# Patient Record
Sex: MALE | Race: WHITE | Employment: FULL TIME | URBAN - METROPOLITAN AREA
[De-identification: names, ages, dates, MRNs, and addresses within clinical notes are randomized per-mention and may not be internally consistent; named-entity substitution may affect disease eponyms.]

---

## 2023-03-22 ENCOUNTER — TELEMEDICINE (OUTPATIENT)
Dept: PHARMACY | Facility: HOSPITAL | Age: 23
End: 2023-03-22
Payer: COMMERCIAL

## 2023-03-22 DIAGNOSIS — E10.9 TYPE 1 DIABETES, HBA1C GOAL < 7% (MULTI): Primary | ICD-10-CM

## 2023-03-22 DIAGNOSIS — E10.9 TYPE 1 DIABETES, HBA1C GOAL < 7% (MULTI): ICD-10-CM

## 2023-03-22 RX ORDER — LANCETS
EACH MISCELLANEOUS
COMMUNITY
Start: 2023-01-15 | End: 2024-03-01 | Stop reason: SDUPTHER

## 2023-03-22 RX ORDER — BLOOD-GLUCOSE SENSOR
EACH MISCELLANEOUS
COMMUNITY
Start: 2023-03-09 | End: 2023-05-16 | Stop reason: SDUPTHER

## 2023-03-22 RX ORDER — INSULIN PUMP CONTROLLER
EACH MISCELLANEOUS
COMMUNITY
Start: 2023-03-09

## 2023-03-22 RX ORDER — BLOOD-GLUCOSE METER
EACH MISCELLANEOUS
COMMUNITY
Start: 2022-10-31

## 2023-03-22 RX ORDER — BLOOD-GLUCOSE TRANSMITTER
EACH MISCELLANEOUS
COMMUNITY
Start: 2023-03-09 | End: 2023-05-15 | Stop reason: SDUPTHER

## 2023-03-22 RX ORDER — BLOOD SUGAR DIAGNOSTIC
STRIP MISCELLANEOUS
COMMUNITY
Start: 2022-10-31 | End: 2024-03-01 | Stop reason: SDUPTHER

## 2023-03-22 RX ORDER — INSULIN ASPART 100 [IU]/ML
INJECTION, SOLUTION INTRAVENOUS; SUBCUTANEOUS
COMMUNITY
Start: 2022-03-24 | End: 2024-03-01 | Stop reason: ALTCHOICE

## 2023-03-22 ASSESSMENT — ENCOUNTER SYMPTOMS: DIABETIC ASSOCIATED SYMPTOMS: 0

## 2023-03-22 NOTE — PROGRESS NOTES
Jimbo Kramer is a 23 y.o. male was referred to Clinical Pharmacy Team to complete a comprehensive medication review (CMR) with a pharmacist as part of the [employer] diabetes program.  The patient was referred for their Diabetes.    Referring Provider: Vj Moody, DO    Subjective   No Known Allergies  No Pharmacies Listed    Diabetes  He presents for his initial diabetic visit. He has type 1 diabetes mellitus. His disease course has been stable. There are no hypoglycemic associated symptoms. There are no diabetic associated symptoms. There are no hypoglycemic complications. Symptoms are stable. There are no diabetic complications. Current diabetic treatment includes insulin pump. He is compliant with treatment all of the time. He is following a generally healthy diet.       Objective     There were no vitals taken for this visit.     LAB  Lab Results   Component Value Date    BILITOT 1.0 02/10/2022    CALCIUM 10.8 (H) 02/24/2022    CO2 29 02/24/2022    CL 98 02/24/2022    CREATININE 0.97 02/24/2022    GLUCOSE 255 (H) 02/24/2022    ALKPHOS 64 02/10/2022    K 4.6 02/24/2022    PROT 7.5 02/10/2022     02/24/2022    AST 15 02/10/2022    ALT 17 02/10/2022    BUN 20 02/24/2022    ANIONGAP 14 02/24/2022    MG 2.10 07/03/2020    ALBUMIN 5.0 02/10/2022    GFRMALE >90 02/24/2022     Lab Results   Component Value Date    TRIG 76 02/10/2022    CHOL 184 02/10/2022    HDL 47.0 02/10/2022     Lab Results   Component Value Date    HGBA1C 7.1 (A) 02/10/2022       Current Outpatient Medications on File Prior to Visit   Medication Sig Dispense Refill    Accu-Chek Guide Me Glucose Mtr misc       Accu-Chek Guide test strips strip USE TO TEST BLOOD SUGAR UP TO 4 TIMES DAILY AS NEEDED      Dexcom G6 Sensor device       Dexcom G6 Transmitter device       NovoLOG U-100 Insulin aspart 100 unit/mL injection Inject under the skin.      Omnipod Dash Pods, Gen 4, cartridge       Accu-Chek Softclix Lancets misc         No current facility-administered medications on file prior to visit.        CURRENT DIABETES PHARMACOTHERAPY  Novolog 100 units/mL: For continuous subQ infusion with insulin pump (up to 40 units per day)  Dexcom G6 Sensor/Transmitter: uses as directed    DRUG INTERACTIONS  - No significant drug-drug interactions exist that require change in therapy    SECONDARY PREVENTION  - Statin? no  - ACE-I/ARB? no    Assessment/Plan   Problem List Items Addressed This Visit    None  Visit Diagnoses       Type 1 diabetes, HbA1c goal < 7% (CMS/[facility])               PATIENT EDUCATION/GOALS    1. Discussed disease specific goals:   - Fasting B - 130 mg/dL  - Postprandial BG: less than 180 mg/dL  - A1c: less than 7%    2. Discussed pharmacy benefit plan:  - Explained that in order to continue receiving diabetic medications with added cost savings ($0 copays), patient must have an annual consultation with a clinical pharmacist to review medications, address any concerns, and manage diabetic medications when appropriate   _______________________________________________________________________  PLAN  Continue all medications as prescribed.   Patient information recorded and transmitted to [employer] for VBID override for diabetes medication copay reduction. Patient's insurance will be updated for the additional benefit of $0 copays in 7-10 business days.    Follow up: 12 months     Olga May, PharmD, LORRIE     Verbal consent to manage patient's drug therapy was obtained from [the patient and/or an individual authorized to act on behalf of a patient]. They were informed they may decline to participate or withdraw from participation in pharmacy services at any time.

## 2023-03-28 NOTE — PROGRESS NOTES
I reviewed the progress note and agree with the resident’s findings and plans as written. Case discussed with resident.    Payam Salas, PharmD

## 2023-05-15 ENCOUNTER — TELEPHONE (OUTPATIENT)
Dept: PRIMARY CARE | Facility: CLINIC | Age: 23
End: 2023-05-15
Payer: COMMERCIAL

## 2023-05-15 DIAGNOSIS — E10.9 TYPE 1 DIABETES MELLITUS WITHOUT COMPLICATION (MULTI): Primary | ICD-10-CM

## 2023-05-15 RX ORDER — BLOOD-GLUCOSE TRANSMITTER
EACH MISCELLANEOUS
Qty: 1 EACH | Refills: 0 | Status: SHIPPED | OUTPATIENT
Start: 2023-05-15 | End: 2023-08-28

## 2023-05-15 NOTE — TELEPHONE ENCOUNTER
Patient advised his Dexcom transmitter  asking for a new prescription to be sent to his pharmacy       Please advise [phone]

## 2023-05-16 DIAGNOSIS — E10.9 TYPE 1 DIABETES MELLITUS WITHOUT COMPLICATION (MULTI): Primary | ICD-10-CM

## 2023-05-16 RX ORDER — BLOOD-GLUCOSE SENSOR
EACH MISCELLANEOUS
Qty: 9 EACH | Refills: 2 | Status: SHIPPED | OUTPATIENT
Start: 2023-05-16 | End: 2024-03-01 | Stop reason: SDUPTHER

## 2023-05-26 LAB
ALANINE AMINOTRANSFERASE (SGPT) (U/L) IN SER/PLAS: 19 U/L (ref 10–52)
ALBUMIN (G/DL) IN SER/PLAS: 4.5 G/DL (ref 3.4–5)
ALBUMIN (MG/L) IN URINE: 9.5 MG/L
ALBUMIN/CREATININE (UG/MG) IN URINE: 12.2 UG/MG CRT (ref 0–30)
ALKALINE PHOSPHATASE (U/L) IN SER/PLAS: 57 U/L (ref 33–120)
ANION GAP IN SER/PLAS: 12 MMOL/L (ref 10–20)
ASPARTATE AMINOTRANSFERASE (SGOT) (U/L) IN SER/PLAS: 15 U/L (ref 9–39)
BILIRUBIN TOTAL (MG/DL) IN SER/PLAS: 0.4 MG/DL (ref 0–1.2)
CALCIUM (MG/DL) IN SER/PLAS: 10.2 MG/DL (ref 8.6–10.6)
CARBON DIOXIDE, TOTAL (MMOL/L) IN SER/PLAS: 29 MMOL/L (ref 21–32)
CHLORIDE (MMOL/L) IN SER/PLAS: 101 MMOL/L (ref 98–107)
CREATININE (MG/DL) IN SER/PLAS: 0.81 MG/DL (ref 0.5–1.3)
CREATININE (MG/DL) IN URINE: 78 MG/DL (ref 20–370)
ESTIMATED AVERAGE GLUCOSE FOR HBA1C: 143 MG/DL
GFR MALE: >90 ML/MIN/1.73M2
GLUCOSE (MG/DL) IN SER/PLAS: 226 MG/DL (ref 74–99)
HEMOGLOBIN A1C/HEMOGLOBIN TOTAL IN BLOOD: 6.6 %
POTASSIUM (MMOL/L) IN SER/PLAS: 4.5 MMOL/L (ref 3.5–5.3)
PROTEIN TOTAL: 7.4 G/DL (ref 6.4–8.2)
SODIUM (MMOL/L) IN SER/PLAS: 137 MMOL/L (ref 136–145)
THYROTROPIN (MIU/L) IN SER/PLAS BY DETECTION LIMIT <= 0.05 MIU/L: 1.45 MIU/L (ref 0.44–3.98)
UREA NITROGEN (MG/DL) IN SER/PLAS: 23 MG/DL (ref 6–23)

## 2023-07-12 PROBLEM — E10.9 DM (DIABETES MELLITUS), TYPE 1 (MULTI): Status: ACTIVE | Noted: 2023-07-12

## 2023-07-12 PROBLEM — F41.9 ANXIETY: Status: ACTIVE | Noted: 2023-07-12

## 2023-07-12 PROBLEM — R07.89 ATYPICAL CHEST PAIN: Status: ACTIVE | Noted: 2023-07-12

## 2023-07-12 PROBLEM — M89.8X7: Status: ACTIVE | Noted: 2023-07-12

## 2023-07-12 PROBLEM — S90.31XA CONTUSION OF RIGHT FOOT: Status: ACTIVE | Noted: 2023-07-12

## 2023-07-14 ENCOUNTER — OFFICE VISIT (OUTPATIENT)
Dept: PRIMARY CARE | Facility: CLINIC | Age: 23
End: 2023-07-14
Payer: COMMERCIAL

## 2023-07-14 ENCOUNTER — PATIENT MESSAGE (OUTPATIENT)
Dept: PRIMARY CARE | Facility: CLINIC | Age: 23
End: 2023-07-14

## 2023-07-14 VITALS
OXYGEN SATURATION: 98 % | DIASTOLIC BLOOD PRESSURE: 72 MMHG | HEIGHT: 70 IN | TEMPERATURE: 97 F | HEART RATE: 72 BPM | BODY MASS INDEX: 25.91 KG/M2 | WEIGHT: 181 LBS | SYSTOLIC BLOOD PRESSURE: 110 MMHG

## 2023-07-14 DIAGNOSIS — R41.840 DIFFICULTY CONCENTRATING: Primary | ICD-10-CM

## 2023-07-14 DIAGNOSIS — F41.9 ANXIETY: ICD-10-CM

## 2023-07-14 PROCEDURE — 3044F HG A1C LEVEL LT 7.0%: CPT | Performed by: INTERNAL MEDICINE

## 2023-07-14 PROCEDURE — 3078F DIAST BP <80 MM HG: CPT | Performed by: INTERNAL MEDICINE

## 2023-07-14 PROCEDURE — 99214 OFFICE O/P EST MOD 30 MIN: CPT | Performed by: INTERNAL MEDICINE

## 2023-07-14 PROCEDURE — 3074F SYST BP LT 130 MM HG: CPT | Performed by: INTERNAL MEDICINE

## 2023-07-14 PROCEDURE — 1036F TOBACCO NON-USER: CPT | Performed by: INTERNAL MEDICINE

## 2023-07-14 RX ORDER — BUPROPION HYDROCHLORIDE 150 MG/1
150 TABLET ORAL EVERY MORNING
Qty: 30 TABLET | Refills: 1 | Status: SHIPPED | OUTPATIENT
Start: 2023-07-14 | End: 2023-09-18 | Stop reason: SDUPTHER

## 2023-07-14 ASSESSMENT — ENCOUNTER SYMPTOMS
DECREASED CONCENTRATION: 1
CONSTIPATION: 0
DIARRHEA: 0
SHORTNESS OF BREATH: 0

## 2023-07-14 NOTE — PROGRESS NOTES
Subjective     Patient ID: Jimbo Kramer is a 23 y.o. male who presents for office visit (anxiety).    The patient reports having difficulty with focus, particularly during conversations related to work and while driving.  He notes that anxiety symptoms otherwise have improved overall since stopping buspirone and escitalopram.  The patient's brother passed away in 2020, and he is grieving the recent loss of his friend to suicide.  He inquires about starting with a Counselor.      The patient has been careful to maintain a healthy diet in the setting of Diabetes Mellitus I, and follows regularly with  from Endocrinology.  His last HbA1c was 6.6% in 5/2023.  He exercises regularly and is able to run an 8 minute mile.  The patient denies any dyspnea or bowel problems.    The patient notes left ear fullness after drinking caffeine.    The patient does not drink alcohol.  He notes a family history of Alcohol Dependence, and has avoided drinking altogether.     The person currently works in  with a [employer], and previously worked in politics.      Review of Systems   HENT:          Positive for left ear fullness.   Respiratory:  Negative for shortness of breath.    Gastrointestinal:  Negative for constipation and diarrhea.   Psychiatric/Behavioral:  Positive for decreased concentration.      Objective   Physical Exam  Constitutional:       Appearance: Normal appearance.   HENT:      Right Ear: Tympanic membrane and ear canal normal. There is no impacted cerumen.      Left Ear: Tympanic membrane and ear canal normal. There is no impacted cerumen.   Cardiovascular:      Rate and Rhythm: Normal rate and regular rhythm.      Heart sounds: Normal heart sounds.   Pulmonary:      Effort: Pulmonary effort is normal.      Breath sounds: Normal breath sounds.   Abdominal:      General: Bowel sounds are normal.      Palpations: Abdomen is soft.      Tenderness: There is no abdominal  tenderness.   Skin:     General: Skin is warm and dry.   Neurological:      General: No focal deficit present.      Mental Status: He is alert and oriented to person, place, and time. Mental status is at baseline.   Psychiatric:         Mood and Affect: Mood normal.         Behavior: Behavior normal.       Assessment/Plan       IMPRESSIONS/PLAN:     Difficulty Concentrating  - Completed ANNE 7 and PHQ 9 in the office today.  Is open to trying medication if necessary, and discussed bupropion including therapeutic expectations and adverse effect profile.  Patient will look into this for now.  Ordered referral to Behavioral Health Collaborative Care with Cheryl Carlson.  Patient prefers virtual visits.    Anxiety   - Symptoms significantly improved since discontinuing buspirone 10 mg BID and escitalopram 15 mg in 10/2022 when the patient ran out of refills. Reports weight gain but no other adverse effects. Completed ANNE 7 and PHQ 9 in the office today.  Ordered referral to Behavioral Health Collaborative Care with Cheryl Carlson.  Patient prefers virtual visits.    /72 in the office today.    DM I   - Last HbA1c 6.6% per 5/2023.  Continue with NovoLog 100 unit/ml as subcutaneous infusion with insulin pump delivering up to 40 units per day.  Following with  from Endocrinology.     Health Maintenance   - Routine labs up to date 2/2022.     Follow-up in 6 months, call sooner if needed.        Scribe Attestation  By signing my name below, IKait Scribe   attest that this documentation has been prepared under the direction and in the presence of Vj Moody DO.

## 2023-08-09 ENCOUNTER — APPOINTMENT (OUTPATIENT)
Dept: PRIMARY CARE | Facility: CLINIC | Age: 23
End: 2023-08-09
Payer: COMMERCIAL

## 2023-08-22 ENCOUNTER — APPOINTMENT (OUTPATIENT)
Dept: PRIMARY CARE | Facility: CLINIC | Age: 23
End: 2023-08-22
Payer: COMMERCIAL

## 2023-08-28 DIAGNOSIS — E10.9 TYPE 1 DIABETES MELLITUS WITHOUT COMPLICATION (MULTI): ICD-10-CM

## 2023-08-28 RX ORDER — BLOOD-GLUCOSE TRANSMITTER
EACH MISCELLANEOUS
Qty: 1 EACH | Refills: 0 | Status: SHIPPED | OUTPATIENT
Start: 2023-08-28 | End: 2023-08-28

## 2023-09-18 DIAGNOSIS — R41.840 DIFFICULTY CONCENTRATING: ICD-10-CM

## 2023-09-18 DIAGNOSIS — F41.9 ANXIETY: ICD-10-CM

## 2023-09-18 RX ORDER — BUPROPION HYDROCHLORIDE 150 MG/1
150 TABLET ORAL EVERY MORNING
Qty: 30 TABLET | Refills: 1 | Status: SHIPPED | OUTPATIENT
Start: 2023-09-18 | End: 2023-11-27 | Stop reason: SDUPTHER

## 2023-09-20 ENCOUNTER — TELEPHONE (OUTPATIENT)
Dept: PRIMARY CARE | Facility: CLINIC | Age: 23
End: 2023-09-20
Payer: COMMERCIAL

## 2023-09-20 DIAGNOSIS — B00.1 RECURRENT COLD SORES: Primary | ICD-10-CM

## 2023-09-20 RX ORDER — VALACYCLOVIR HYDROCHLORIDE 1 G/1
2000 TABLET, FILM COATED ORAL 2 TIMES DAILY
Qty: 4 TABLET | Refills: 1 | Status: SHIPPED | OUTPATIENT
Start: 2023-09-20 | End: 2023-09-20

## 2023-09-20 NOTE — TELEPHONE ENCOUNTER
Patient has an outbreak of cold sores and would like to know if you could prescribe Acyclovir for him       Uses [facility].     Please advise

## 2023-11-27 ENCOUNTER — PHARMACY VISIT (OUTPATIENT)
Dept: PHARMACY | Facility: CLINIC | Age: 23
End: 2023-11-27
Payer: COMMERCIAL

## 2023-11-27 DIAGNOSIS — F41.9 ANXIETY: ICD-10-CM

## 2023-11-27 DIAGNOSIS — R41.840 DIFFICULTY CONCENTRATING: ICD-10-CM

## 2023-11-27 DIAGNOSIS — E10.9 TYPE 1 DIABETES MELLITUS WITHOUT COMPLICATION (MULTI): ICD-10-CM

## 2023-11-27 PROCEDURE — RXMED WILLOW AMBULATORY MEDICATION CHARGE

## 2023-11-27 RX ORDER — BLOOD-GLUCOSE TRANSMITTER
EACH MISCELLANEOUS
Qty: 1 EACH | Refills: 0 | Status: SHIPPED | OUTPATIENT
Start: 2023-11-27 | End: 2024-02-29 | Stop reason: SDUPTHER

## 2023-11-27 RX ORDER — BUPROPION HYDROCHLORIDE 150 MG/1
150 TABLET ORAL EVERY MORNING
Qty: 30 TABLET | Refills: 1 | Status: SHIPPED | OUTPATIENT
Start: 2023-11-27 | End: 2024-01-30 | Stop reason: SDUPTHER

## 2024-01-30 DIAGNOSIS — F41.9 ANXIETY: ICD-10-CM

## 2024-01-30 DIAGNOSIS — R41.840 DIFFICULTY CONCENTRATING: ICD-10-CM

## 2024-01-30 RX ORDER — BUPROPION HYDROCHLORIDE 150 MG/1
150 TABLET ORAL EVERY MORNING
Qty: 30 TABLET | Refills: 3 | Status: SHIPPED | OUTPATIENT
Start: 2024-01-30 | End: 2024-02-02 | Stop reason: SDUPTHER

## 2024-02-01 DIAGNOSIS — F41.9 ANXIETY: Primary | ICD-10-CM

## 2024-02-02 DIAGNOSIS — F41.9 ANXIETY: ICD-10-CM

## 2024-02-02 DIAGNOSIS — R41.840 DIFFICULTY CONCENTRATING: ICD-10-CM

## 2024-02-02 RX ORDER — BUPROPION HYDROCHLORIDE 150 MG/1
150 TABLET ORAL EVERY MORNING
Qty: 30 TABLET | Refills: 3 | Status: SHIPPED | OUTPATIENT
Start: 2024-02-02 | End: 2024-02-29 | Stop reason: SDUPTHER

## 2024-02-16 ENCOUNTER — SOCIAL WORK (OUTPATIENT)
Dept: PRIMARY CARE | Facility: CLINIC | Age: 24
End: 2024-02-16
Payer: COMMERCIAL

## 2024-02-16 DIAGNOSIS — F41.9 ANXIETY: ICD-10-CM

## 2024-02-16 ASSESSMENT — PATIENT HEALTH QUESTIONNAIRE - PHQ9
5. POOR APPETITE OR OVEREATING: NOT AT ALL
10. IF YOU CHECKED OFF ANY PROBLEMS, HOW DIFFICULT HAVE THESE PROBLEMS MADE IT FOR YOU TO DO YOUR WORK, TAKE CARE OF THINGS AT HOME, OR GET ALONG WITH OTHER PEOPLE: EXTREMELY DIFFICULT
7. TROUBLE CONCENTRATING ON THINGS, SUCH AS READING THE NEWSPAPER OR WATCHING TELEVISION: NEARLY EVERY DAY
4. FEELING TIRED OR HAVING LITTLE ENERGY: NOT AT ALL
2. FEELING DOWN, DEPRESSED OR HOPELESS: SEVERAL DAYS
3. TROUBLE FALLING OR STAYING ASLEEP: SEVERAL DAYS
1. LITTLE INTEREST OR PLEASURE IN DOING THINGS: SEVERAL DAYS
SUM OF ALL RESPONSES TO PHQ QUESTIONS 1-9: 9
8. MOVING OR SPEAKING SO SLOWLY THAT OTHER PEOPLE COULD HAVE NOTICED. OR THE OPPOSITE, BEING SO FIGETY OR RESTLESS THAT YOU HAVE BEEN MOVING AROUND A LOT MORE THAN USUAL: NEARLY EVERY DAY
SUM OF ALL RESPONSES TO PHQ9 QUESTIONS 1 & 2: 2
9. THOUGHTS THAT YOU WOULD BE BETTER OFF DEAD, OR OF HURTING YOURSELF: NOT AT ALL
6. FEELING BAD ABOUT YOURSELF - OR THAT YOU ARE A FAILURE OR HAVE LET YOURSELF OR YOUR FAMILY DOWN: NOT AT ALL

## 2024-02-16 ASSESSMENT — ANXIETY QUESTIONNAIRES
4. TROUBLE RELAXING: NEARLY EVERY DAY
3. WORRYING TOO MUCH ABOUT DIFFERENT THINGS: NEARLY EVERY DAY
5. BEING SO RESTLESS THAT IT IS HARD TO SIT STILL: NOT AT ALL
2. NOT BEING ABLE TO STOP OR CONTROL WORRYING: NOT AT ALL
GAD7 TOTAL SCORE: 8
6. BECOMING EASILY ANNOYED OR IRRITABLE: SEVERAL DAYS
IF YOU CHECKED OFF ANY PROBLEMS ON THIS QUESTIONNAIRE, HOW DIFFICULT HAVE THESE PROBLEMS MADE IT FOR YOU TO DO YOUR WORK, TAKE CARE OF THINGS AT HOME, OR GET ALONG WITH OTHER PEOPLE: SOMEWHAT DIFFICULT
1. FEELING NERVOUS, ANXIOUS, OR ON EDGE: SEVERAL DAYS
7. FEELING AFRAID AS IF SOMETHING AWFUL MIGHT HAPPEN: NOT AT ALL

## 2024-02-16 NOTE — PROGRESS NOTES
Collaborative Care (CoC) Initial Assessment    Session Time  Start: 11:05 AM  End: 12:31 PM     Collaborative Care program information (including case discussion with psychiatry, involvement of State mental health facility and billing when applicable) was provided and discussed with the patient. Patient Indicated understanding and agreed to proceed.   Confirm: Yes    Patient Health Questionnaire-9 Score: 9 (2/16/2024  2:26 PM)  ANNE-7 Total Score: 8 (2/16/2024  2:28 PM)        Reason for Visit / Chief Complaint  Chief Complaint   Patient presents with    Initial assessment    Anxiety       Accompanied by: Self  Guardian Status: Self  Caregiver Status: Does not have a caregiver    Review of Symptoms    Sleep   Average Hours Sleep in/Night: 8  Prepares Self for Sleep at Time: 9:30 PM  Usual Wake up Time: 6:15 AM  Sleep Symptoms: difficulty falling asleep  Sleep Hygiene: good sleep hygiene    Mood   Symptom Onset/Duration: Last year  Current Sx: little interest/pleasure doing things, feeling down, trouble falling asleep, trouble concentrating, fidgety/restless, and anger/irritability  Triggers:  On the weekends when he is not working  Past Sx: None, denied    Anxiety   Symptom Onset/Duration: Last 1-2 years  Current Sx: feeling nervous/anxious/on edge, worrying too much, trouble relaxing, easily annoyed/irritable, trouble concentrating, afraid something awful may happen, racing thoughts, avoidance, social anxiety, rumination, perfectionism, and somatic symptoms  Panic / Somatic Sx: rapid heartbeat and muscle tension  Triggers: situation(s)- leaving a VM, meetings, waiting in line  Past Sx: none, denied    Self-Esteem / Self-Image   Self Esteem Rating (1-10 Scale, 10 being high): 5  Self-Esteem / Self Image Sx: struggles with confidence, feels inferior to others, judges self, negative self-talk, self-doubt, imposter syndrome, and feels need to be perfect    Appetite   Description of Overall Appetite: denied any concerns  Eating Behaviors: eats  balanced meals  Concerns with appetite: none, denied    Anger / Irritability  Symptoms of Anger / Irritability: internalized anger and easily agitated     Communication / Self Expression  Communication Style & Concerns: assertive, extroverted, and difficulty trusting In romantic relationships    Trauma      Traumatic Experiences: none, denied    Pt reported his brother  in 2019 from overdose and a close childhood friend  by suicide last year. Pt reported he tries to stay busy so that he does not have to think about this and he has never really grieved.     Abuse History  Physical Abuse: No  Sexual Abuse: No  Verbal / Emotional Abuse / Bullying (+Cyber): No   Financial Abuse: No  Domestic Violence: No    Grief / Loss / Adjustment   Symptom Onset/Duration: more than 1 year  Current Sx: avoidance  Factors of Grief / Loss / Adjustment: loss of loved one(s)    Hallucinations / Delusions   Hallucinations & Delusions Experienced: none, denied    Learning Concerns / Memory   Learning Concerns & Sx: trouble with focus and concentrating, difficulty paying attention, fidgety, daydreaming, distracted by external stimuli, problems reading/writing, and procrastinating, loses his train of thought during conversations which is embarrassing for him.  Memory Concerns & Sx: difficulty communicating    Functional impairment   Impacting ADL's: no impairment   Impacting IADL's: No impairment  Impacting Ability : to focus/concentrate and to be present    Associated Medical Concerns   Potential Associated Factors: diabetes      Comprehensive Behavioral Health History     Medications  Current Mental Health Medications:   Wellbutrin / bupropion XL; Dose: 150 mg; Side effects: None, denied    Past Mental Health Medications:   Buspar / buspirone; Dose: 10 BID; Side effects: decreased libido, weight gain, and increased depression  Lexapro / escitalopram; Dose: 15 mg; Side effects: decreased libido, weight gain, and increased  depression    Concerns / challenges / barriers with taking medications? No concerns    Open to medication recommendations from consulting psychiatrist? Yes    Do you ever forget to take your medication? Yes  If yes, how often? 1-2x/week    Mental Health Treatment History  Mental Health Treatment: individual therapy  Reason/When/Where/Outcome: When his father had a mental health episode    Risk History  Suicidal Thoughts/Method/Intent/Plan: None, denied  Suicide Attempts/Preparations: None, denied  Number of Suicide Attempts: 0  Access to Firearms/Lethal Means: No guns in home  Non-Suicidal Self Injury: None, denied  Last Amonate Risk Score:    Protective Factors: N/A    Violence: None, denied  Homicidal Thoughts/Method/Plan/Intent: None, denied  Homicidal Attempts/Preparations: None, denied  Number of Attempts: 0      Substance Use History    Substances    Social History     Substance and Sexual Activity   Alcohol Use Never     Social History     Substance and Sexual Activity   Drug Use Never       Substance Current Use   Alcohol Minimal use                   Addiction Treatment     Types of Addiction Treatment: Denies      Family History    Mental Health / Conditions    Family Member Condition / Diagnosis Medications / Side Effects   Father Bipolar- suspected unsure of diagnosis                     Substance Use    Family Member Substance Current Use   Brother Heroin No-  via overdose in 2019                      History of Suicide    Family Member Details   Friend Completed attempt           Social History    Housing   Living Situation: Lives alone and lives in apartment  Safe Housing Conditions / Feels Safe in Home: Yes    Employment  Current Employment: full-time  Current Concerns/Challenges: No    Income   Current Concerns/Challenges: No  Receive Benefits/Assistance: No    Education   Status / Level of Education: Bachelor's degree from [employer]    Legal   Legal Considerations: None,  "denied    Relationships   S/O:  Single  Parents/Guardian: Strained relationship with dad due to mental health status, close with his mom   Siblings: Pt older brother  since 2019, Pt has 1 older sister who he does not speak to. Pt has 1 younger sister he is somewhat close with   Friends: Has close supportive group of friends       Active Duty? No  Are you a ? No    Sexuality / Gender   Concerns with Sexuality/Gender: None, denied  Sexual Orientation: heterosexual    Preferred Gender Pronouns / Identity: He/him/his    Transportation   Transportation Concerns: None, denied    Pentecostalism/ Spirituality   Are you Pentecostalism or Spiritual: No  Pentecostalism / Practice: Non-Hinduism  Spiritual Practice: None, denied    Coping / Strengths / Supports   Coping:  breathing exercises and exercise  Strengths: funny and easy to get along with, successful in his career   Supports: Friend      Assessment Summary  / Plan    Assessment Summary:  What do you want to work on/get out of collaborative care? \"Coping skills, communicating thoughts effectively\"    Plan:   Psych consult - ongoing, set meeting frequency, bi-weekly, Awjolhn-Woaqhbze-Yzqcgmal interventions, and provide psycho-education    Follow up in 2 weeks (on 3/1/2024) for Next scheduled follow-up.    Provisional Findings / Impressions  Primary: ADHD    Secondary: Generalized Anxiety Disorder    Goals    Care Plan    There is no care plan documentation to display.       "

## 2024-02-22 ENCOUNTER — DOCUMENTATION (OUTPATIENT)
Dept: BEHAVIORAL HEALTH | Facility: CLINIC | Age: 24
End: 2024-02-22
Payer: COMMERCIAL

## 2024-02-22 NOTE — PROGRESS NOTES
[facility] Psychiatry Consult Note     Jimbo Kramer is a 23 y.o., referred to Collaborative Care for symptoms of depression and anxiety. I have reviewed the patient with the behavioral health manager and reviewed the patient's electronic record.    Recommendations:   - likely ADHD - treating this could help management of T1D - BHM will do BAARS  - titrate Wellbutrin to max dose (450 mg daily)  - if BAARS supports suspected diagnosis of ADHD and Wellbutrin with only partial effects, I recommend switching to a stimulant, starting with Adderall first, and Concerta as an alternative  - I don't recommend further med management to target anxiety at this time, as ADHD seems to be predominant - this could change as we see more and treat ADHD  - BHM to help with ADHD skills and anxiety coping skills    Diagnosis: ADHD +/- ANNE  FH RANDY  T1D - inconsistent control, recently improving    Wellbutrin  mg daily - helping with ADHD symptoms    Past:  Buspar - hated, although taken with Lexapro and AE likely due to the latter  Lexapro - hated, gained weight and lower libido    Patient Health Questionnaire-9 Score: 9 (2/16/2024  2:26 PM)  ANNE-7 Total Score: 8 (2/16/2024  2:28 PM)    Adderall XR (Amphetamine-Dextroamphetamine)  =================================  - Mechanism: sympathomimetic amines that promote release of catecholamines (primarily dopamine and norepinephrine) from their storage sites in the presynaptic nerve terminals.  - Dosing for ADHD: 10-20 mg daily; may increase based on response and tolerability at weekly intervals in increments of 5-10 mg/day up to a maximum dose of 60 mg/day, in divided doses if needed for duration of effect.   - Discontinuation: While usually not necessary, taper over a week or so is prudent.  - Adverse Effects  - Avoid in the context of arrhythmias (which it can worsen) and dilated cardiomyopathies; can be used with hypertrophic cardiomyopathy but discuss with cardiology (ICD mitigates  risk).  - Benefit likely outweighs risk for sympathetic activation: average increases in blood pressure (3-6/2-4 mm Hg) and heart rate (4-5 bpm) are marginal.  - Common side effects: xerostomia, anorexia with weight loss, constipation, insomnia, headache, agitation, sexual dysfunction.  - Risk of addiction is low but does happen - follow  controlled substance guidelines.  - Reproductive considerations: Does cross the placenta and present in breast milk, but risk to fetus/ unclear    Concerta (Methylphenidate, long-acting)  ==========================  - Mechanism: blocks the reuptake of norepinephrine and dopamine into presynaptic neurons  - Dosing for ADHD: 18-36 mg daily; may increase based on response and tolerability at weekly intervals in increments of 18 mg/day up to a maximum dose of 72 mg/day, in divided doses if needed for duration of effect.   - Discontinuation: While usually not necessary, taper over a week or so is prudent.  - Adverse Effects  - Avoid in the context of arrhythmias (which it can worsen) and dilated cardiomyopathies; can be used with hypertrophic cardiomyopathy but discuss with cardiology (ICD mitigates risk).  - Benefit likely outweighs risk for sympathetic activation: average increases in blood pressure (3-6/2-4 mm Hg) and heart rate (4-5 bpm) are marginal.  - Common side effects: xerostomia, anorexia with weight loss, GI upset, headache, insomnia hyperhidrosis, agitation, sexual dysfunction.  - Risk of addiction is low but does happen - follow  controlled substance guidelines.  - Reproductive considerations: Does cross the placenta and present in breast milk, but risk to fetus/ unclear      The above treatment considerations and suggestions are based on consultations with the patient's care manager and a review of information available in the electronic medical record. I have not personally examined the patient. All recommendations should be implemented with  consideration of the patient's relevant prior history and current clinical status. Please feel free to call me with any questions about the care of this patient. I can easily be reached through [email or web address] Chat.

## 2024-02-29 ENCOUNTER — PHARMACY VISIT (OUTPATIENT)
Dept: PHARMACY | Facility: CLINIC | Age: 24
End: 2024-02-29
Payer: COMMERCIAL

## 2024-02-29 ENCOUNTER — PATIENT MESSAGE (OUTPATIENT)
Dept: PRIMARY CARE | Facility: CLINIC | Age: 24
End: 2024-02-29
Payer: COMMERCIAL

## 2024-02-29 DIAGNOSIS — B00.1 RECURRENT COLD SORES: ICD-10-CM

## 2024-02-29 DIAGNOSIS — E10.9 TYPE 1 DIABETES MELLITUS WITHOUT COMPLICATION (MULTI): Primary | ICD-10-CM

## 2024-02-29 DIAGNOSIS — F41.9 ANXIETY: ICD-10-CM

## 2024-02-29 DIAGNOSIS — R41.840 DIFFICULTY CONCENTRATING: ICD-10-CM

## 2024-02-29 DIAGNOSIS — E10.9 TYPE 1 DIABETES MELLITUS WITHOUT COMPLICATION (MULTI): ICD-10-CM

## 2024-02-29 PROCEDURE — RXMED WILLOW AMBULATORY MEDICATION CHARGE

## 2024-02-29 RX ORDER — BUPROPION HYDROCHLORIDE 150 MG/1
150 TABLET ORAL EVERY MORNING
Qty: 30 TABLET | Refills: 3 | Status: SHIPPED | OUTPATIENT
Start: 2024-02-29 | End: 2024-03-27 | Stop reason: SDUPTHER

## 2024-02-29 RX ORDER — VALACYCLOVIR HYDROCHLORIDE 1 G/1
TABLET, FILM COATED ORAL
Qty: 4 TABLET | Refills: 1 | Status: SHIPPED | OUTPATIENT
Start: 2024-02-29 | End: 2024-04-25 | Stop reason: SDUPTHER

## 2024-02-29 RX ORDER — INSULIN ASPART 100 [IU]/ML
INJECTION, SOLUTION INTRAVENOUS; SUBCUTANEOUS
Qty: 40 ML | Refills: 0 | Status: SHIPPED | OUTPATIENT
Start: 2024-02-29 | End: 2024-03-01 | Stop reason: ALTCHOICE

## 2024-02-29 RX ORDER — BLOOD-GLUCOSE TRANSMITTER
EACH MISCELLANEOUS
Qty: 1 EACH | Refills: 0 | Status: SHIPPED | OUTPATIENT
Start: 2024-02-29 | End: 2024-03-01 | Stop reason: SDUPTHER

## 2024-02-29 NOTE — PROGRESS NOTES
[facility] (VBID)    Jimbo Kramer is a 23 y.o. male was referred to Clinical Pharmacy Team to complete a comprehensive medication review (CMR) with a pharmacist as part of the [employer] diabetes program.      Referring Provider: Marzena Abad MD     Subjective   No Known Allergies    [facility]  [address] , [address]  [facility] [address]  Phone: [phone] Fax: [phone]    [id number] - [place], [place] - [address] AT [facility] [address]  & [facility]  [address] [facility] [address]  Phone: [phone] Fax: [phone]    [facility] #[id number] - [place], [place] - [address] [place] [address] AT [facility] & [address] [address]  Phone: [phone] Fax: [phone]    [facility] #[id number] - [place], [place] - [address] AT [facility] [address]  [address]  [facility] [address]  Phone: [phone] Fax: [phone]    Diabetes  He presents for his follow-up diabetic visit. He has type 1 diabetes mellitus. His disease course has been stable. There are no hypoglycemic associated symptoms. There are no diabetic associated symptoms. There are no hypoglycemic complications. Symptoms are stable. There are no diabetic complications. Risk factors for coronary artery disease include male sex. Current diabetic treatment includes insulin injections. He is compliant with treatment all of the time. An ACE inhibitor/angiotensin II receptor blocker is not being taken. He does not see a podiatrist.Eye exam is not current.     SMBG:   Patient uses Dexcom G6 CGM system with Omnipod   States his blood sugars typically range in the 110s-140s fasting in the morning  States his blood sugars increase up to 300s max after eating  Reports that when he enters carbohydrates with his mealtime insulin, he feels he is not being given enough  insulin to cover his meals as his post-meal blood sugars are going up to the 200s and sometimes 300s   Hypoglycemic episodes: none     Objective     LAB  Lab Results   Component Value Date    BILITOT 0.4 05/26/2023    CALCIUM 10.2 05/26/2023    CO2 29 05/26/2023     05/26/2023    CREATININE 0.81 05/26/2023    GLUCOSE 226 (H) 05/26/2023    ALKPHOS 57 05/26/2023    K 4.5 05/26/2023    PROT 7.4 05/26/2023     05/26/2023    AST 15 05/26/2023    ALT 19 05/26/2023    BUN 23 05/26/2023    ANIONGAP 12 05/26/2023    MG 2.10 07/03/2020    ALBUMIN 4.5 05/26/2023    GFRMALE >90 05/26/2023     Lab Results   Component Value Date    TRIG 76 02/10/2022    CHOL 184 02/10/2022    HDL 47.0 02/10/2022     Lab Results   Component Value Date    HGBA1C 6.6 (A) 05/26/2023    HGBA1C 7.1 (A) 02/10/2022    HGBA1C 13.2 07/04/2020     Medication Reconciliation:   Patient's medication list confirmed accurate by patient, no changes were made to patient's prior to visit medication list during encounter today     Current Outpatient Medications on File Prior to Visit   Medication Sig Dispense Refill    Accu-Chek Guide Me Glucose Mtr misc       buPROPion XL (Wellbutrin XL) 150 mg 24 hr tablet Take 1 tablet (150 mg) by mouth once daily in the morning. Do not crush, chew, or split. 30 tablet 3    insulin pump cart,automated,BT cartridge APPLY AS DIRECTED TO INFUSE INSULIN. CHANGE PODS EVERY 3 DAYS 10 each 11    Omnipod Dash Pods, Gen 4, cartridge       valACYclovir (Valtrex) 1 gram tablet TAKE 2 TABLETS (2,000 MG) BY MOUTH 2 TIMES A DAY FOR 1 DAY. 4 tablet 1    [DISCONTINUED] Accu-Chek Guide test strips strip USE TO TEST BLOOD SUGAR UP TO 4 TIMES DAILY AS NEEDED      [DISCONTINUED] Accu-Chek Softclix Lancets misc       [DISCONTINUED] blood-glucose sensor (Dexcom G6 Sensor) device Apply 1 sensor as directed, replace every 10 days 9 each 2    [DISCONTINUED] blood-glucose sensor device APPLY ONE SENSOR AS DIRECTED: REPLACE EVERY 10 DAYS. 9  each 3    [DISCONTINUED] blood-glucose transmitter device (Dexcom G6 Transmitter) device INSERT TRANSMITTER INTO SENSOR AFTER EACH PLACEMENT. (REPLACE TRANSMITTER EVERY 3 MONTHS). 1 each 0    [DISCONTINUED] insulin aspart (NovoLOG) 100 unit/mL injection FOR CONTINUOUS SUBCUTANEOUS INFUSION WITH INSULIN PUMP (UP TO 40 UNITS PER DAY). Must schedule follow up for further refills. 40 mL 0    [DISCONTINUED] buPROPion XL (Wellbutrin XL) 150 mg 24 hr tablet Take 1 tablet (150 mg) by mouth once daily in the morning. Do not crush, chew, or split. 30 tablet 3    [DISCONTINUED] Dexcom G4 platinum transmitter (Dexcom G6 Transmitter) device INSERT TRANSMITTER INTO SENSOR AFTER EACH PLACEMENT. (REPLACE TRANSMITTER EVERY 3 MONTHS). 1 each 0    [DISCONTINUED] insulin aspart (NovoLOG) 100 unit/mL injection FOR CONTINUOUS SUBCUTANEOUS INFUSION WITH INSULIN PUMP (UP TO 40 UNITS PER DAY) 40 mL 2    [DISCONTINUED] NovoLOG U-100 Insulin aspart 100 unit/mL injection Inject under the skin.      [DISCONTINUED] valACYclovir (Valtrex) 1 gram tablet TAKE 2 TABLETS (2,000 MG) BY MOUTH 2 TIMES A DAY FOR 1 DAY. 4 tablet 1     No current facility-administered medications on file prior to visit.      Current Diabetes Medications:   Novolog 100 unit/mL for continuous subcutaneous infusion with insulin pump up to 40 units per day     Historical Diabetes Medications:  None     Secondary Prevention  Statin? No  ACE-I/ARB? No  Aspirin? No    Health Maintenance:   Foot Exam: Unsure, patient does believe that Dr. Abad looked at his feet at last office visit ~ 1 year ago   Eye Exam: Is due for an updated eye exam, last one a few years ago   Lipid Panel:  mg/dL, TG 76 mg/dL 02/10/22  Urine Albumin: 12.2 ug/mg crt 05/26/23  Influenza Vaccine: did not receive the flu shot this year   Pneumonia Vaccine: PPSV23 07/09/20    Drug Interactions   No drug-drug interactions exist    Assessment/Plan   Problem List Items Addressed This Visit       DM  (diabetes mellitus), type 1 (CMS/[facility]) - Primary     Jimbo Kramer has controlled but worsening type 1 diabetes as evidenced by his most recent A1c of 6.6% on 05/26/23 however he is reporting that his post-prandial blood sugars are increasing up to the 200s and occasionally 300s maximum. He feels that when he enters in the carbs from his meals that the insulin is not appropriately covering what he is eating. He also expresses frustration with the OmniPod automatic delivery.   Patient was encouraged to schedule his follow-up visit with Dr. Abad and to get back in contact with the endocrinology pharmacist in her office. It is likely that his pump settings may need increased to increase his insulin to carb ratio. He verbalized understanding and stated that he would reach out and schedule his follow-up with Dr. Abad's office soon.   Due to insurance formulary changes, Novolog is no longer covered under patient's insurance and Humalog is preferred. New prescription for 3 month supply of Humalog was sent in to the pharmacy.   START: Insulin lispro (Humalog) 100 unit/mL for continuous subcutaneous infusion with insulin pump (up to 40 units per day)  STOP: Insulin aspart (Novolog) 100 unit/mL    Employee Health Diabetes Program (VBID)  Patient enrolled in  Employee Diabetes Program for $0 co-pays on diabetes medications/supplies. Enrollment should be active in 2-4 weeks.   In addition to Humalog prescription, refills were submitted for patients Dexcom G6 and testing supplies.          Relevant Medications    blood-glucose transmitter device (Dexcom G6 Transmitter) device    blood-glucose sensor (Dexcom G6 Sensor) device    blood sugar diagnostic (Accu-Chek Guide test strips) strip    lancets (Accu-Chek Softclix Lancets) misc    insulin lispro (HumaLOG U-100 Insulin) 100 unit/mL injection    Other Relevant Orders    Follow Up In Clinical Pharmacy    Follow Up In Clinical Pharmacy      Pharmacy Follow up: ~11  months for VBID renewal   Endocrinology Follow up: Not currently scheduled, patient will reach out to schedule     Ravi Anderson PharmD     Continue all meds under the continuation of care with the referring provider and clinical pharmacy team.    Verbal consent to manage patient's drug therapy was obtained from the patient. They were informed they may decline to participate or withdraw from participation in pharmacy services at any time.

## 2024-03-01 ENCOUNTER — APPOINTMENT (OUTPATIENT)
Dept: PRIMARY CARE | Facility: CLINIC | Age: 24
End: 2024-03-01
Payer: COMMERCIAL

## 2024-03-01 ENCOUNTER — TELEMEDICINE (OUTPATIENT)
Dept: PHARMACY | Facility: HOSPITAL | Age: 24
End: 2024-03-01
Payer: COMMERCIAL

## 2024-03-01 ENCOUNTER — PHARMACY VISIT (OUTPATIENT)
Dept: PHARMACY | Facility: CLINIC | Age: 24
End: 2024-03-01

## 2024-03-01 ENCOUNTER — DOCUMENTATION (OUTPATIENT)
Dept: PRIMARY CARE | Facility: CLINIC | Age: 24
End: 2024-03-01

## 2024-03-01 DIAGNOSIS — E10.9 TYPE 1 DIABETES MELLITUS WITHOUT COMPLICATION (MULTI): Primary | ICD-10-CM

## 2024-03-01 DIAGNOSIS — F90.9 ATTENTION DEFICIT HYPERACTIVITY DISORDER (ADHD), UNSPECIFIED ADHD TYPE: Primary | ICD-10-CM

## 2024-03-01 PROCEDURE — RXMED WILLOW AMBULATORY MEDICATION CHARGE

## 2024-03-01 PROCEDURE — 99494 1ST/SBSQ PSYC COLLAB CARE: CPT | Performed by: INTERNAL MEDICINE

## 2024-03-01 PROCEDURE — 99492 1ST PSYC COLLAB CARE MGMT: CPT | Performed by: INTERNAL MEDICINE

## 2024-03-01 RX ORDER — BLOOD-GLUCOSE SENSOR
EACH MISCELLANEOUS
Qty: 9 EACH | Refills: 3 | Status: SHIPPED | OUTPATIENT
Start: 2024-03-01

## 2024-03-01 RX ORDER — BLOOD-GLUCOSE TRANSMITTER
EACH MISCELLANEOUS
Qty: 1 EACH | Refills: 3 | Status: SHIPPED | OUTPATIENT
Start: 2024-03-01 | End: 2025-03-01

## 2024-03-01 RX ORDER — BLOOD SUGAR DIAGNOSTIC
STRIP MISCELLANEOUS
Qty: 100 STRIP | Refills: 11 | Status: SHIPPED | OUTPATIENT
Start: 2024-03-01

## 2024-03-01 RX ORDER — INSULIN LISPRO 100 [IU]/ML
INJECTION, SOLUTION INTRAVENOUS; SUBCUTANEOUS
Qty: 40 ML | Refills: 0 | Status: SHIPPED | OUTPATIENT
Start: 2024-03-01 | End: 2024-04-25 | Stop reason: SDUPTHER

## 2024-03-01 RX ORDER — LANCETS
EACH MISCELLANEOUS
Qty: 100 EACH | Refills: 11 | Status: SHIPPED | OUTPATIENT
Start: 2024-03-01

## 2024-03-01 ASSESSMENT — ENCOUNTER SYMPTOMS: DIABETIC ASSOCIATED SYMPTOMS: 0

## 2024-03-01 NOTE — ASSESSMENT & PLAN NOTE
Jimbo Kramer has controlled but worsening type 1 diabetes as evidenced by his most recent A1c of 6.6% on 05/26/23 however he is reporting that his post-prandial blood sugars are increasing up to the 200s and occasionally 300s maximum. He feels that when he enters in the carbs from his meals that the insulin is not appropriately covering what he is eating. He also expresses frustration with the OmniPod automatic delivery.   Patient was encouraged to schedule his follow-up visit with Dr. Abad and to get back in contact with the endocrinology pharmacist in her office. It is likely that his pump settings may need increased to increase his insulin to carb ratio. He verbalized understanding and stated that he would reach out and schedule his follow-up with Dr. Abad's office soon.   Due to insurance formulary changes, Novolog is no longer covered under patient's insurance and Humalog is preferred. New prescription for 3 month supply of Humalog was sent in to the pharmacy.   START: Insulin lispro (Humalog) 100 unit/mL for continuous subcutaneous infusion with insulin pump (up to 40 units per day)  STOP: Insulin aspart (Novolog) 100 unit/mL    Employee Health Diabetes Program (VBID)  Patient enrolled in  Employee Diabetes Program for $0 co-pays on diabetes medications/supplies. Enrollment should be active in 2-4 weeks.   In addition to Humalog prescription, refills were submitted for patients Dexcom G6 and testing supplies.   Patient educated that there are slight differences between Humalog and Novolog but they should work similar clinically. He was encouraged to reach out to the endocrinology office if he notices any major changes in his blood sugars after switching.

## 2024-03-04 ENCOUNTER — PHARMACY VISIT (OUTPATIENT)
Dept: PHARMACY | Facility: CLINIC | Age: 24
End: 2024-03-04
Payer: COMMERCIAL

## 2024-03-04 ENCOUNTER — TELEPHONE (OUTPATIENT)
Dept: PRIMARY CARE | Facility: CLINIC | Age: 24
End: 2024-03-04
Payer: COMMERCIAL

## 2024-03-21 ENCOUNTER — TELEPHONE (OUTPATIENT)
Dept: PRIMARY CARE | Facility: CLINIC | Age: 24
End: 2024-03-21
Payer: COMMERCIAL

## 2024-03-22 ENCOUNTER — APPOINTMENT (OUTPATIENT)
Dept: PHARMACY | Facility: HOSPITAL | Age: 24
End: 2024-03-22
Payer: COMMERCIAL

## 2024-03-22 ENCOUNTER — TELEPHONE (OUTPATIENT)
Dept: PRIMARY CARE | Facility: CLINIC | Age: 24
End: 2024-03-22

## 2024-03-27 DIAGNOSIS — R41.840 DIFFICULTY CONCENTRATING: ICD-10-CM

## 2024-03-27 DIAGNOSIS — E10.9 TYPE 1 DIABETES MELLITUS WITHOUT COMPLICATION (MULTI): Primary | ICD-10-CM

## 2024-03-27 DIAGNOSIS — F41.9 ANXIETY: ICD-10-CM

## 2024-03-27 DIAGNOSIS — E10.9 TYPE 1 DIABETES MELLITUS WITHOUT COMPLICATION (MULTI): ICD-10-CM

## 2024-03-27 PROCEDURE — RXMED WILLOW AMBULATORY MEDICATION CHARGE

## 2024-03-27 RX ORDER — BUPROPION HYDROCHLORIDE 150 MG/1
150 TABLET ORAL EVERY MORNING
Qty: 30 TABLET | Refills: 0 | Status: SHIPPED | OUTPATIENT
Start: 2024-03-27 | End: 2024-04-03 | Stop reason: ALTCHOICE

## 2024-03-27 RX ORDER — INSULIN PMP CART,AUT,G6/7,CNTR
EACH SUBCUTANEOUS
Qty: 10 EACH | Refills: 11 | Status: SHIPPED | OUTPATIENT
Start: 2024-03-27 | End: 2025-03-27

## 2024-03-28 ENCOUNTER — PHARMACY VISIT (OUTPATIENT)
Dept: PHARMACY | Facility: CLINIC | Age: 24
End: 2024-03-28
Payer: COMMERCIAL

## 2024-03-28 RX ORDER — INSULIN LISPRO 100 [IU]/ML
INJECTION, SOLUTION INTRAVENOUS; SUBCUTANEOUS
Qty: 40 ML | Refills: 0 | OUTPATIENT
Start: 2024-03-28

## 2024-04-03 ENCOUNTER — OFFICE VISIT (OUTPATIENT)
Dept: PRIMARY CARE | Facility: CLINIC | Age: 24
End: 2024-04-03
Payer: COMMERCIAL

## 2024-04-03 VITALS
BODY MASS INDEX: 27.51 KG/M2 | DIASTOLIC BLOOD PRESSURE: 70 MMHG | OXYGEN SATURATION: 99 % | RESPIRATION RATE: 16 BRPM | WEIGHT: 189 LBS | SYSTOLIC BLOOD PRESSURE: 128 MMHG | HEART RATE: 98 BPM | TEMPERATURE: 97.3 F

## 2024-04-03 DIAGNOSIS — E10.9 TYPE 1 DIABETES MELLITUS WITHOUT COMPLICATION (MULTI): ICD-10-CM

## 2024-04-03 DIAGNOSIS — F90.9 ATTENTION DEFICIT HYPERACTIVITY DISORDER (ADHD), UNSPECIFIED ADHD TYPE: Primary | ICD-10-CM

## 2024-04-03 PROCEDURE — 3078F DIAST BP <80 MM HG: CPT | Performed by: INTERNAL MEDICINE

## 2024-04-03 PROCEDURE — 3074F SYST BP LT 130 MM HG: CPT | Performed by: INTERNAL MEDICINE

## 2024-04-03 PROCEDURE — 1036F TOBACCO NON-USER: CPT | Performed by: INTERNAL MEDICINE

## 2024-04-03 PROCEDURE — 99214 OFFICE O/P EST MOD 30 MIN: CPT | Performed by: INTERNAL MEDICINE

## 2024-04-03 RX ORDER — DEXTROAMPHETAMINE SACCHARATE, AMPHETAMINE ASPARTATE MONOHYDRATE, DEXTROAMPHETAMINE SULFATE AND AMPHETAMINE SULFATE 3.75; 3.75; 3.75; 3.75 MG/1; MG/1; MG/1; MG/1
15 CAPSULE, EXTENDED RELEASE ORAL EVERY MORNING
Qty: 30 CAPSULE | Refills: 0 | Status: SHIPPED | OUTPATIENT
Start: 2024-04-03 | End: 2024-04-11 | Stop reason: SDUPTHER

## 2024-04-03 ASSESSMENT — ENCOUNTER SYMPTOMS: DECREASED CONCENTRATION: 1

## 2024-04-03 NOTE — PROGRESS NOTES
Patient here for ADHD medication     Subjective   Patient ID: Jimbo Kramer is a 24 y.o. male who presents for ADHD.    The patient reports difficulty with concentrating both at work, and in social situations.  He has been taking bupropion 150mg once daily in the mornings since 2023.  The patient has an upcoming appointment with [facility] next week.  He denies any history of cardiac disease.    The patient maintains an active lifestyle, and exercises every morning.      ADHD      Review of Systems   Psychiatric/Behavioral:  Positive for decreased concentration.      Objective   Physical Exam  Constitutional:       Appearance: Normal appearance.   Neck:      Vascular: No carotid bruit.   Cardiovascular:      Rate and Rhythm: Normal rate and regular rhythm.      Heart sounds: Normal heart sounds.   Pulmonary:      Effort: Pulmonary effort is normal.      Breath sounds: Normal breath sounds.   Abdominal:      General: Bowel sounds are normal.      Palpations: Abdomen is soft.      Tenderness: There is no abdominal tenderness.   Skin:     General: Skin is warm and dry.   Neurological:      General: No focal deficit present.      Mental Status: He is alert and oriented to person, place, and time. Mental status is at baseline.   Psychiatric:         Mood and Affect: Mood normal.         Behavior: Behavior normal.       Assessment/Plan   Problem List Items Addressed This Visit             ICD-10-CM    DM (diabetes mellitus), type 1 (CMS/HCC) E10.9    ADHD - Primary F90.9    Relevant Medications    amphetamine-dextroamphetamine XR (Adderall XR) 15 mg 24 hr capsule       IMPRESSIONS/PLAN:     ADHD  - Completed ASRS v 1.1 in the office today.  Advised patient to take bupropion 150mg every other day until 4/14/2024.  In that case, patient is okay to start Adderall on 4/17/2024 only after he has tapered off of bupropion.  Prescribed Adderall XR 15mg QAM.  Discussed adverse effect profile of  Adderall and therapeutic expectations.  Advised him not to consume pre-workout products due to caffeine.  Patient verbalized understanding of this plan and instructions.  Prescribed Following with [facility], and  from Behavioral Health.  Patient prefers virtual visits.      /70 in the office today.     DM I   - Last HbA1c 6.6% per 5/2023.  Continue with NovoLog 100 unit/ml as subcutaneous infusion with insulin pump delivering up to 40 units per day.  Following with  from Endocrinology.     Anxiety   - Symptoms significantly improved since discontinuing buspirone 10 mg BID and escitalopram 15 mg in 10/2022 when the patient ran out of refills. Reports weight gain but no other adverse effects. Completed ANNE 7 and PHQ 9 in the office today.  Following with [facility].  Patient prefers virtual visits.    Health Maintenance   - Routine labs up to date 2/2022.     Follow-up in 6 months, call sooner if needed.        Scribe Attestation  By signing my name below, I, Kait Andrew, Torin   attest that this documentation has been prepared under the direction and in the presence of Vj Moody DO.   Kait Andrew 04/03/24 3:36 PM

## 2024-04-05 ENCOUNTER — SOCIAL WORK (OUTPATIENT)
Dept: PRIMARY CARE | Facility: CLINIC | Age: 24
End: 2024-04-05
Payer: COMMERCIAL

## 2024-04-08 NOTE — PROGRESS NOTES
"Collaborative Care (CoCM)  Progress Note    Type of Interaction: Virtual    Start Time: 2:00 PM    End Time: 2:51 PM      Appointment: Scheduled    Reason for Visit:   Chief Complaint   Patient presents with    Anxiety    ADHD    Follow-up        Interval History / Patient Symptoms:     [facility] met with pt for scheduled virtual session. M reviewed psych consult recommendations with pt and provided psychoeduacation regarding symptoms, side effects, etc. Pt reported he discussed starting Adderall with PCP already and feels optimistic about it. Pt processed that he tends to avoid difficult thoughts and feelings by pouring himself into work or trying to keep busy with other things such as exercise. Pt reported he gets overwhelmed by expectations of other people and worries about letting people down. Pt reported he lacks confidence at work because it is challenging for him to engage in conversations due to difficulty focusing. Pt processed challenges with \"getting anything started.. once I do it I am fine but getting started on a task can be so hard.\" [facility] challenged pt thought distortions throughout session and assisted pt with cognitive restructuring.     Interventions Provided: Psychoeducation and Acceptance & Commitment Therapy      Progress Made: Minimum    Response to Intervention: Pt was engaged throughout session and was receptive towards [facility] feedback and interventions provided.     Plan:     -Pt will complete BAARS-IV screen to review during next session  -Follow up with [facility] in 3 weeks    Follow Up / Next Appointment: Next appointment: 04/26/24      "

## 2024-04-11 DIAGNOSIS — F90.9 ATTENTION DEFICIT HYPERACTIVITY DISORDER (ADHD), UNSPECIFIED ADHD TYPE: ICD-10-CM

## 2024-04-11 RX ORDER — DEXTROAMPHETAMINE SACCHARATE, AMPHETAMINE ASPARTATE MONOHYDRATE, DEXTROAMPHETAMINE SULFATE AND AMPHETAMINE SULFATE 3.75; 3.75; 3.75; 3.75 MG/1; MG/1; MG/1; MG/1
15 CAPSULE, EXTENDED RELEASE ORAL EVERY MORNING
Qty: 30 CAPSULE | Refills: 0 | Status: SHIPPED | OUTPATIENT
Start: 2024-04-11 | End: 2024-04-11 | Stop reason: SDUPTHER

## 2024-04-11 RX ORDER — DEXTROAMPHETAMINE SACCHARATE, AMPHETAMINE ASPARTATE MONOHYDRATE, DEXTROAMPHETAMINE SULFATE AND AMPHETAMINE SULFATE 3.75; 3.75; 3.75; 3.75 MG/1; MG/1; MG/1; MG/1
15 CAPSULE, EXTENDED RELEASE ORAL EVERY MORNING
Qty: 30 CAPSULE | Refills: 0 | Status: SHIPPED | OUTPATIENT
Start: 2024-04-11 | End: 2024-05-13 | Stop reason: SDUPTHER

## 2024-04-16 ENCOUNTER — APPOINTMENT (OUTPATIENT)
Dept: ENDOCRINOLOGY | Facility: CLINIC | Age: 24
End: 2024-04-16
Payer: COMMERCIAL

## 2024-04-25 DIAGNOSIS — B00.1 RECURRENT COLD SORES: ICD-10-CM

## 2024-04-25 DIAGNOSIS — E10.9 TYPE 1 DIABETES MELLITUS WITHOUT COMPLICATION (MULTI): ICD-10-CM

## 2024-04-25 PROCEDURE — RXMED WILLOW AMBULATORY MEDICATION CHARGE

## 2024-04-25 RX ORDER — VALACYCLOVIR HYDROCHLORIDE 1 G/1
TABLET, FILM COATED ORAL
Qty: 4 TABLET | Refills: 1 | Status: SHIPPED | OUTPATIENT
Start: 2024-04-25 | End: 2025-04-25

## 2024-04-26 ENCOUNTER — APPOINTMENT (OUTPATIENT)
Dept: PRIMARY CARE | Facility: CLINIC | Age: 24
End: 2024-04-26
Payer: COMMERCIAL

## 2024-04-26 ENCOUNTER — TELEPHONE (OUTPATIENT)
Dept: PRIMARY CARE | Facility: CLINIC | Age: 24
End: 2024-04-26

## 2024-04-26 RX ORDER — INSULIN LISPRO 100 [IU]/ML
INJECTION, SOLUTION INTRAVENOUS; SUBCUTANEOUS
Qty: 40 ML | Refills: 0 | Status: SHIPPED | OUTPATIENT
Start: 2024-04-26

## 2024-04-30 PROCEDURE — 99493 SBSQ PSYC COLLAB CARE MGMT: CPT

## 2024-05-01 ENCOUNTER — PHARMACY VISIT (OUTPATIENT)
Dept: PHARMACY | Facility: CLINIC | Age: 24
End: 2024-05-01
Payer: COMMERCIAL

## 2024-05-01 ENCOUNTER — DOCUMENTATION (OUTPATIENT)
Dept: PRIMARY CARE | Facility: CLINIC | Age: 24
End: 2024-05-01
Payer: COMMERCIAL

## 2024-05-01 DIAGNOSIS — F41.9 ANXIETY: Primary | ICD-10-CM

## 2024-05-02 ENCOUNTER — PHARMACY VISIT (OUTPATIENT)
Dept: PHARMACY | Facility: CLINIC | Age: 24
End: 2024-05-02
Payer: COMMERCIAL

## 2024-05-02 PROCEDURE — RXMED WILLOW AMBULATORY MEDICATION CHARGE

## 2024-05-03 ENCOUNTER — PHARMACY VISIT (OUTPATIENT)
Dept: PHARMACY | Facility: CLINIC | Age: 24
End: 2024-05-03

## 2024-05-10 ENCOUNTER — SOCIAL WORK (OUTPATIENT)
Dept: PRIMARY CARE | Facility: CLINIC | Age: 24
End: 2024-05-10
Payer: COMMERCIAL

## 2024-05-10 ENCOUNTER — PHARMACY VISIT (OUTPATIENT)
Dept: PHARMACY | Facility: CLINIC | Age: 24
End: 2024-05-10
Payer: COMMERCIAL

## 2024-05-10 PROCEDURE — RXMED WILLOW AMBULATORY MEDICATION CHARGE

## 2024-05-10 ASSESSMENT — ANXIETY QUESTIONNAIRES
IF YOU CHECKED OFF ANY PROBLEMS ON THIS QUESTIONNAIRE, HOW DIFFICULT HAVE THESE PROBLEMS MADE IT FOR YOU TO DO YOUR WORK, TAKE CARE OF THINGS AT HOME, OR GET ALONG WITH OTHER PEOPLE: NOT DIFFICULT AT ALL
6. BECOMING EASILY ANNOYED OR IRRITABLE: SEVERAL DAYS
7. FEELING AFRAID AS IF SOMETHING AWFUL MIGHT HAPPEN: NOT AT ALL
4. TROUBLE RELAXING: SEVERAL DAYS
2. NOT BEING ABLE TO STOP OR CONTROL WORRYING: NOT AT ALL
GAD7 TOTAL SCORE: 3
3. WORRYING TOO MUCH ABOUT DIFFERENT THINGS: SEVERAL DAYS
5. BEING SO RESTLESS THAT IT IS HARD TO SIT STILL: NOT AT ALL
1. FEELING NERVOUS, ANXIOUS, OR ON EDGE: NOT AT ALL

## 2024-05-10 ASSESSMENT — PATIENT HEALTH QUESTIONNAIRE - PHQ9
8. MOVING OR SPEAKING SO SLOWLY THAT OTHER PEOPLE COULD HAVE NOTICED. OR THE OPPOSITE, BEING SO FIGETY OR RESTLESS THAT YOU HAVE BEEN MOVING AROUND A LOT MORE THAN USUAL: NOT AT ALL
SUM OF ALL RESPONSES TO PHQ9 QUESTIONS 1 & 2: 1
10. IF YOU CHECKED OFF ANY PROBLEMS, HOW DIFFICULT HAVE THESE PROBLEMS MADE IT FOR YOU TO DO YOUR WORK, TAKE CARE OF THINGS AT HOME, OR GET ALONG WITH OTHER PEOPLE: NOT DIFFICULT AT ALL
2. FEELING DOWN, DEPRESSED OR HOPELESS: NOT AT ALL
5. POOR APPETITE OR OVEREATING: NOT AT ALL
3. TROUBLE FALLING OR STAYING ASLEEP: NOT AT ALL
7. TROUBLE CONCENTRATING ON THINGS, SUCH AS READING THE NEWSPAPER OR WATCHING TELEVISION: NOT AT ALL
9. THOUGHTS THAT YOU WOULD BE BETTER OFF DEAD, OR OF HURTING YOURSELF: NOT AT ALL
1. LITTLE INTEREST OR PLEASURE IN DOING THINGS: SEVERAL DAYS
SUM OF ALL RESPONSES TO PHQ QUESTIONS 1-9: 2
4. FEELING TIRED OR HAVING LITTLE ENERGY: SEVERAL DAYS
6. FEELING BAD ABOUT YOURSELF - OR THAT YOU ARE A FAILURE OR HAVE LET YOURSELF OR YOUR FAMILY DOWN: NOT AT ALL

## 2024-05-10 NOTE — PROGRESS NOTES
"Collaborative Care (CoCM)  Progress Note    Type of Interaction: Virtual    Start Time: 10:32 AM    End Time: 11:09 AM        Appointment: Scheduled    Reason for Visit:   Chief Complaint   Patient presents with    Anxiety    ADHD    Follow-up       Interval History / Patient Symptoms:     Patient Health Questionnaire-9 Score: 2 (5/10/2024  4:50 PM)  ANNE-7 Total Score: 3 (5/10/2024  4:50 PM)    [facility] met with pt fr scheduled virtual session. Pt reported he has been feeling a lot better. Pt reported his appetite is good and he has been drinking plenty of water throughout the day. Pt reported he has been mindful of caffeine intake. Pt reported his ability to concentrate and focus at work has improved significantly. He reported he can follow conversations and absorb information covered in important meetings which helps his confidence. Pt reported his sleep is good too and he is getting about 7.5 hours per night. Pt denied mood irritability. Pt reported he has even started to read for pleasure and is able to concentrate when reading as well. Pt did note that he noticed the medication wearing off at around 1 or 2:00 PM and reported \"It feels like I crash and that I am not on anything at all.. and that is a problem because I have a few hours left and need my focus to get through.\" [facility] will contact psych consultant/ pt PCP regarding pt concern and follow up with pt as appropriate.     Interventions Provided: Psychoeducation and Review Progress/Goals Stress Management      Progress Made: Significant    Response to Intervention: Pt was engaged throughout session and was receptive towards [facility] feedback and interventions provided.     Plan:     -[facility] will contact PCP regarding psychotropic medication   -Follow up with [facility] in 2 weeks    Follow Up / Next Appointment: Next appointment: 05/31/24      "

## 2024-05-13 DIAGNOSIS — F90.9 ATTENTION DEFICIT HYPERACTIVITY DISORDER (ADHD), UNSPECIFIED ADHD TYPE: ICD-10-CM

## 2024-05-13 RX ORDER — DEXTROAMPHETAMINE SACCHARATE, AMPHETAMINE ASPARTATE MONOHYDRATE, DEXTROAMPHETAMINE SULFATE AND AMPHETAMINE SULFATE 3.75; 3.75; 3.75; 3.75 MG/1; MG/1; MG/1; MG/1
15 CAPSULE, EXTENDED RELEASE ORAL EVERY MORNING
Qty: 30 CAPSULE | Refills: 0 | Status: SHIPPED | OUTPATIENT
Start: 2024-05-13 | End: 2024-06-12

## 2024-05-13 NOTE — TELEPHONE ENCOUNTER
----- Message from Jimbo Kramer sent at 5/13/2024 11:48 AM EDT -----  Regarding: Adderall prescription   Contact: [phone]  Ace Riley,    Hope you had a nice weekend. I'm leaving town tomorrow morning and won't return until Friday. Can I refill my Adderall prescription today? I only have three capsules left.    Here is a [facility] address that had the Adderall in stock: [address] [place] [address]    Thanks in advance.    Rickie Kramer

## 2024-05-24 DIAGNOSIS — R41.840 DIFFICULTY CONCENTRATING: Primary | ICD-10-CM

## 2024-05-29 RX ORDER — DEXTROAMPHETAMINE SACCHARATE, AMPHETAMINE ASPARTATE, DEXTROAMPHETAMINE SULFATE AND AMPHETAMINE SULFATE 1.25; 1.25; 1.25; 1.25 MG/1; MG/1; MG/1; MG/1
5 TABLET ORAL DAILY PRN
Qty: 30 TABLET | Refills: 0 | Status: SHIPPED | OUTPATIENT
Start: 2024-05-29 | End: 2024-06-05

## 2024-05-31 ENCOUNTER — SOCIAL WORK (OUTPATIENT)
Dept: PRIMARY CARE | Facility: CLINIC | Age: 24
End: 2024-05-31
Payer: COMMERCIAL

## 2024-05-31 PROCEDURE — 99494 1ST/SBSQ PSYC COLLAB CARE: CPT

## 2024-05-31 PROCEDURE — 99493 SBSQ PSYC COLLAB CARE MGMT: CPT
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
## 2024-06-04 ENCOUNTER — DOCUMENTATION (OUTPATIENT)
Dept: PRIMARY CARE | Facility: CLINIC | Age: 24
End: 2024-06-04
Payer: COMMERCIAL

## 2024-06-04 DIAGNOSIS — F41.9 ANXIETY: Primary | ICD-10-CM

## 2024-06-11 ENCOUNTER — SOCIAL WORK (OUTPATIENT)
Dept: PRIMARY CARE | Facility: CLINIC | Age: 24
End: 2024-06-11
Payer: COMMERCIAL

## 2024-06-11 ENCOUNTER — PHARMACY VISIT (OUTPATIENT)
Dept: PHARMACY | Facility: CLINIC | Age: 24
End: 2024-06-11
Payer: COMMERCIAL

## 2024-06-11 PROCEDURE — RXMED WILLOW AMBULATORY MEDICATION CHARGE

## 2024-06-11 NOTE — PROGRESS NOTES
Collaborative Care (CoCM)  Progress Note    Type of Interaction: In Office    Start Time: 3:07 PM    End Time: 4:07 PM      Appointment: Scheduled    Reason for Visit:   Chief Complaint   Patient presents with    Anxiety    ADHD    Follow-up          Interval History / Patient Symptoms:     Patient Health Questionnaire-9 Score: 2 (2024  1:25 PM)  ANNE-7 Total Score: 6 (2024  1:26 PM)    [facility] met with pt for scheduled office visit. Pt reported doing well since last visit and noted being very busy traveling for work and recently moving. Pt reported he has not taken any time off and has not unpacked his apartment which is causing him to feel overwhelmed. Pt reported he went to a wedding with his family and people from his high school and family friends were there. This brought up difficult feelings and emotions for pt. Pt processed feelings of resentment towards his parents and how this might impact his future romantic relationships. Pt processed how he has had trouble connecting with others since his brother . [facility] challenged pt thought distortions throughout session and assisted pt with cognitive restructuring. [facility] encouraged pt to communicate assertively with his employer to enforce healthy boundaries and a healthy work life balance to decrease the likelihood of burnout. Pt stated he is unsure if he will take any time off because that overwhelms him but he knows that he probably needs to.    Interventions Provided: [place] Setting and Acceptance & Commitment Therapy      Progress Made: Moderate    Response to Intervention: Pt was engaged throughout session and was receptive towards [facility] feedback and interventions provided.     Plan:     -Follow up with [facility] in 4 weeks due to pt work/ travel schedule    Follow Up / Next Appointment: Next appointment: 07/10/24

## 2024-06-13 DIAGNOSIS — F90.9 ATTENTION DEFICIT HYPERACTIVITY DISORDER (ADHD), UNSPECIFIED ADHD TYPE: ICD-10-CM

## 2024-06-13 RX ORDER — DEXTROAMPHETAMINE SACCHARATE, AMPHETAMINE ASPARTATE MONOHYDRATE, DEXTROAMPHETAMINE SULFATE AND AMPHETAMINE SULFATE 3.75; 3.75; 3.75; 3.75 MG/1; MG/1; MG/1; MG/1
15 CAPSULE, EXTENDED RELEASE ORAL EVERY MORNING
Qty: 30 CAPSULE | Refills: 0 | Status: SHIPPED | OUTPATIENT
Start: 2024-06-13 | End: 2024-07-13

## 2024-06-14 ASSESSMENT — ANXIETY QUESTIONNAIRES
1. FEELING NERVOUS, ANXIOUS, OR ON EDGE: SEVERAL DAYS
IF YOU CHECKED OFF ANY PROBLEMS ON THIS QUESTIONNAIRE, HOW DIFFICULT HAVE THESE PROBLEMS MADE IT FOR YOU TO DO YOUR WORK, TAKE CARE OF THINGS AT HOME, OR GET ALONG WITH OTHER PEOPLE: NOT DIFFICULT AT ALL
6. BECOMING EASILY ANNOYED OR IRRITABLE: NOT AT ALL
5. BEING SO RESTLESS THAT IT IS HARD TO SIT STILL: NOT AT ALL
GAD7 TOTAL SCORE: 6
2. NOT BEING ABLE TO STOP OR CONTROL WORRYING: MORE THAN HALF THE DAYS
4. TROUBLE RELAXING: NOT AT ALL
3. WORRYING TOO MUCH ABOUT DIFFERENT THINGS: MORE THAN HALF THE DAYS
7. FEELING AFRAID AS IF SOMETHING AWFUL MIGHT HAPPEN: SEVERAL DAYS

## 2024-06-14 ASSESSMENT — PATIENT HEALTH QUESTIONNAIRE - PHQ9
8. MOVING OR SPEAKING SO SLOWLY THAT OTHER PEOPLE COULD HAVE NOTICED. OR THE OPPOSITE, BEING SO FIGETY OR RESTLESS THAT YOU HAVE BEEN MOVING AROUND A LOT MORE THAN USUAL: NOT AT ALL
2. FEELING DOWN, DEPRESSED OR HOPELESS: NOT AT ALL
10. IF YOU CHECKED OFF ANY PROBLEMS, HOW DIFFICULT HAVE THESE PROBLEMS MADE IT FOR YOU TO DO YOUR WORK, TAKE CARE OF THINGS AT HOME, OR GET ALONG WITH OTHER PEOPLE: NOT DIFFICULT AT ALL
6. FEELING BAD ABOUT YOURSELF - OR THAT YOU ARE A FAILURE OR HAVE LET YOURSELF OR YOUR FAMILY DOWN: SEVERAL DAYS
5. POOR APPETITE OR OVEREATING: NOT AT ALL
SUM OF ALL RESPONSES TO PHQ QUESTIONS 1-9: 2
7. TROUBLE CONCENTRATING ON THINGS, SUCH AS READING THE NEWSPAPER OR WATCHING TELEVISION: NOT AT ALL
SUM OF ALL RESPONSES TO PHQ9 QUESTIONS 1 & 2: 1
4. FEELING TIRED OR HAVING LITTLE ENERGY: NOT AT ALL
9. THOUGHTS THAT YOU WOULD BE BETTER OFF DEAD, OR OF HURTING YOURSELF: NOT AT ALL
1. LITTLE INTEREST OR PLEASURE IN DOING THINGS: SEVERAL DAYS
3. TROUBLE FALLING OR STAYING ASLEEP: NOT AT ALL

## 2024-06-27 ENCOUNTER — DOCUMENTATION (OUTPATIENT)
Dept: PRIMARY CARE | Facility: CLINIC | Age: 24
End: 2024-06-27
Payer: COMMERCIAL

## 2024-06-27 DIAGNOSIS — F41.9 ANXIETY: Primary | ICD-10-CM

## 2024-06-28 DIAGNOSIS — R41.840 DIFFICULTY CONCENTRATING: ICD-10-CM

## 2024-06-28 RX ORDER — DEXTROAMPHETAMINE SACCHARATE, AMPHETAMINE ASPARTATE, DEXTROAMPHETAMINE SULFATE AND AMPHETAMINE SULFATE 1.25; 1.25; 1.25; 1.25 MG/1; MG/1; MG/1; MG/1
5 TABLET ORAL DAILY PRN
Qty: 30 TABLET | Refills: 0 | Status: SHIPPED | OUTPATIENT
Start: 2024-06-28 | End: 2024-07-28

## 2024-07-10 ENCOUNTER — APPOINTMENT (OUTPATIENT)
Dept: PRIMARY CARE | Facility: CLINIC | Age: 24
End: 2024-07-10
Payer: COMMERCIAL

## 2024-07-14 DIAGNOSIS — E10.9 TYPE 1 DIABETES MELLITUS WITHOUT COMPLICATION (MULTI): ICD-10-CM

## 2024-07-15 DIAGNOSIS — F90.9 ATTENTION DEFICIT HYPERACTIVITY DISORDER (ADHD), UNSPECIFIED ADHD TYPE: ICD-10-CM

## 2024-07-15 DIAGNOSIS — R41.840 DIFFICULTY CONCENTRATING: ICD-10-CM

## 2024-07-15 PROCEDURE — RXMED WILLOW AMBULATORY MEDICATION CHARGE

## 2024-07-15 RX ORDER — DEXTROAMPHETAMINE SACCHARATE, AMPHETAMINE ASPARTATE MONOHYDRATE, DEXTROAMPHETAMINE SULFATE AND AMPHETAMINE SULFATE 3.75; 3.75; 3.75; 3.75 MG/1; MG/1; MG/1; MG/1
15 CAPSULE, EXTENDED RELEASE ORAL EVERY MORNING
Qty: 30 CAPSULE | Refills: 0 | Status: SHIPPED | OUTPATIENT
Start: 2024-07-15 | End: 2024-08-15

## 2024-07-15 RX ORDER — DEXTROAMPHETAMINE SACCHARATE, AMPHETAMINE ASPARTATE, DEXTROAMPHETAMINE SULFATE AND AMPHETAMINE SULFATE 1.25; 1.25; 1.25; 1.25 MG/1; MG/1; MG/1; MG/1
5 TABLET ORAL DAILY PRN
Qty: 30 TABLET | Refills: 0 | Status: SHIPPED | OUTPATIENT
Start: 2024-07-15 | End: 2024-08-14

## 2024-07-15 RX ORDER — INSULIN LISPRO 100 [IU]/ML
INJECTION, SOLUTION INTRAVENOUS; SUBCUTANEOUS
Qty: 40 ML | Refills: 0 | Status: SHIPPED | OUTPATIENT
Start: 2024-07-15

## 2024-07-16 ENCOUNTER — PHARMACY VISIT (OUTPATIENT)
Dept: PHARMACY | Facility: CLINIC | Age: 24
End: 2024-07-16
Payer: COMMERCIAL

## 2024-07-16 ENCOUNTER — TELEPHONE (OUTPATIENT)
Dept: PRIMARY CARE | Facility: CLINIC | Age: 24
End: 2024-07-16
Payer: COMMERCIAL

## 2024-07-22 PROCEDURE — RXMED WILLOW AMBULATORY MEDICATION CHARGE

## 2024-07-31 ENCOUNTER — PHARMACY VISIT (OUTPATIENT)
Dept: PHARMACY | Facility: CLINIC | Age: 24
End: 2024-07-31
Payer: COMMERCIAL

## 2024-08-02 ENCOUNTER — TELEPHONE (OUTPATIENT)
Dept: PRIMARY CARE | Facility: CLINIC | Age: 24
End: 2024-08-02
Payer: COMMERCIAL

## 2024-08-04 ENCOUNTER — PATIENT MESSAGE (OUTPATIENT)
Dept: ENDOCRINOLOGY | Facility: CLINIC | Age: 24
End: 2024-08-04
Payer: COMMERCIAL

## 2024-08-04 ENCOUNTER — PATIENT MESSAGE (OUTPATIENT)
Dept: PRIMARY CARE | Facility: CLINIC | Age: 24
End: 2024-08-04
Payer: COMMERCIAL

## 2024-08-05 DIAGNOSIS — R41.840 DIFFICULTY CONCENTRATING: ICD-10-CM

## 2024-08-05 RX ORDER — DEXTROAMPHETAMINE SACCHARATE, AMPHETAMINE ASPARTATE, DEXTROAMPHETAMINE SULFATE AND AMPHETAMINE SULFATE 1.25; 1.25; 1.25; 1.25 MG/1; MG/1; MG/1; MG/1
5 TABLET ORAL DAILY PRN
Qty: 30 TABLET | Refills: 0 | Status: SHIPPED | OUTPATIENT
Start: 2024-08-05 | End: 2024-09-04

## 2024-08-09 DIAGNOSIS — B00.1 RECURRENT COLD SORES: ICD-10-CM

## 2024-08-09 DIAGNOSIS — E10.9 TYPE 1 DIABETES MELLITUS WITHOUT COMPLICATION (MULTI): ICD-10-CM

## 2024-08-09 DIAGNOSIS — F90.9 ATTENTION DEFICIT HYPERACTIVITY DISORDER (ADHD), UNSPECIFIED ADHD TYPE: ICD-10-CM

## 2024-08-09 PROCEDURE — RXMED WILLOW AMBULATORY MEDICATION CHARGE

## 2024-08-09 RX ORDER — INSULIN LISPRO 100 [IU]/ML
INJECTION, SOLUTION INTRAVENOUS; SUBCUTANEOUS
Qty: 40 ML | Refills: 0 | Status: SHIPPED | OUTPATIENT
Start: 2024-08-09

## 2024-08-09 RX ORDER — VALACYCLOVIR HYDROCHLORIDE 1 G/1
TABLET, FILM COATED ORAL
Qty: 4 TABLET | Refills: 1 | Status: SHIPPED | OUTPATIENT
Start: 2024-08-09 | End: 2025-08-09

## 2024-08-09 RX ORDER — DEXTROAMPHETAMINE SACCHARATE, AMPHETAMINE ASPARTATE MONOHYDRATE, DEXTROAMPHETAMINE SULFATE AND AMPHETAMINE SULFATE 3.75; 3.75; 3.75; 3.75 MG/1; MG/1; MG/1; MG/1
15 CAPSULE, EXTENDED RELEASE ORAL EVERY MORNING
Qty: 30 CAPSULE | Refills: 0 | Status: SHIPPED | OUTPATIENT
Start: 2024-08-09 | End: 2024-09-08

## 2024-08-13 ENCOUNTER — PHARMACY VISIT (OUTPATIENT)
Dept: PHARMACY | Facility: CLINIC | Age: 24
End: 2024-08-13
Payer: COMMERCIAL

## 2024-08-13 DIAGNOSIS — E10.69 TYPE 1 DIABETES MELLITUS WITH OTHER SPECIFIED COMPLICATION (MULTI): ICD-10-CM

## 2024-08-13 PROCEDURE — RXMED WILLOW AMBULATORY MEDICATION CHARGE

## 2024-08-13 RX ORDER — BLOOD-GLUCOSE,RECEIVER,CONT
EACH MISCELLANEOUS
Qty: 1 EACH | Refills: 0 | Status: SHIPPED | OUTPATIENT
Start: 2024-08-13

## 2024-08-13 RX ORDER — BLOOD-GLUCOSE SENSOR
EACH MISCELLANEOUS
Qty: 9 EACH | Refills: 1 | Status: SHIPPED | OUTPATIENT
Start: 2024-08-13

## 2024-08-14 ENCOUNTER — APPOINTMENT (OUTPATIENT)
Dept: PRIMARY CARE | Facility: CLINIC | Age: 24
End: 2024-08-14
Payer: COMMERCIAL

## 2024-08-19 ENCOUNTER — PHARMACY VISIT (OUTPATIENT)
Dept: PHARMACY | Facility: CLINIC | Age: 24
End: 2024-08-19
Payer: COMMERCIAL

## 2024-08-22 ENCOUNTER — APPOINTMENT (OUTPATIENT)
Dept: PRIMARY CARE | Facility: CLINIC | Age: 24
End: 2024-08-22
Payer: COMMERCIAL

## 2024-09-03 ENCOUNTER — APPOINTMENT (OUTPATIENT)
Dept: PRIMARY CARE | Facility: CLINIC | Age: 24
End: 2024-09-03
Payer: COMMERCIAL

## 2024-09-04 ENCOUNTER — APPOINTMENT (OUTPATIENT)
Dept: PRIMARY CARE | Facility: CLINIC | Age: 24
End: 2024-09-04
Payer: COMMERCIAL

## 2024-09-04 VITALS
TEMPERATURE: 97.7 F | DIASTOLIC BLOOD PRESSURE: 70 MMHG | BODY MASS INDEX: 26.05 KG/M2 | RESPIRATION RATE: 16 BRPM | OXYGEN SATURATION: 98 % | SYSTOLIC BLOOD PRESSURE: 128 MMHG | HEART RATE: 107 BPM | WEIGHT: 179 LBS

## 2024-09-04 DIAGNOSIS — M62.838 MUSCLE SPASM: ICD-10-CM

## 2024-09-04 DIAGNOSIS — Z00.00 HEALTHCARE MAINTENANCE: Primary | ICD-10-CM

## 2024-09-04 DIAGNOSIS — F90.9 ATTENTION DEFICIT HYPERACTIVITY DISORDER (ADHD), UNSPECIFIED ADHD TYPE: ICD-10-CM

## 2024-09-04 PROCEDURE — 90656 IIV3 VACC NO PRSV 0.5 ML IM: CPT | Performed by: INTERNAL MEDICINE

## 2024-09-04 PROCEDURE — 90471 IMMUNIZATION ADMIN: CPT | Performed by: INTERNAL MEDICINE

## 2024-09-04 PROCEDURE — 99214 OFFICE O/P EST MOD 30 MIN: CPT | Performed by: INTERNAL MEDICINE

## 2024-09-04 PROCEDURE — 3078F DIAST BP <80 MM HG: CPT | Performed by: INTERNAL MEDICINE

## 2024-09-04 PROCEDURE — 1036F TOBACCO NON-USER: CPT | Performed by: INTERNAL MEDICINE

## 2024-09-04 PROCEDURE — 3074F SYST BP LT 130 MM HG: CPT | Performed by: INTERNAL MEDICINE

## 2024-09-04 RX ORDER — INSULIN ASPART 100 [IU]/ML
INJECTION, SOLUTION INTRAVENOUS; SUBCUTANEOUS
COMMUNITY

## 2024-09-04 RX ORDER — DEXTROAMPHETAMINE SACCHARATE, AMPHETAMINE ASPARTATE MONOHYDRATE, DEXTROAMPHETAMINE SULFATE AND AMPHETAMINE SULFATE 5; 5; 5; 5 MG/1; MG/1; MG/1; MG/1
20 CAPSULE, EXTENDED RELEASE ORAL EVERY MORNING
Qty: 30 CAPSULE | Refills: 0 | Status: SHIPPED | OUTPATIENT
Start: 2024-09-04 | End: 2024-10-04

## 2024-09-04 ASSESSMENT — ENCOUNTER SYMPTOMS
ABDOMINAL PAIN: 0
NECK PAIN: 1
ROS SKIN COMMENTS: POSITIVE FOR HAIR LOSS.
DIARRHEA: 0
NERVOUS/ANXIOUS: 0
CONSTIPATION: 0

## 2024-09-04 NOTE — PROGRESS NOTES
Patient here for a follow up    Subjective   Patient ID: Jimbo Kramer is a 24 y.o. male who presents for Follow-up.    The patient believes that he may be developing tolerance to Adderall 15mg in the morning and 5mg in the afternoon.  He finds that the medications works well initially before wearing off in the late afternoon.  The patient is otherwise tolerating the medication well and denies any adverse effects including palpitations.     The patient mentions transient neck pain due to a likely pinched nerve which improved with physical therapy exercises.  He is scheduled for an upcoming massage as well.    The patient inquires whether SGLP-1 agonists would be a good option for him.  He is currently taking NovoLog 100 unit/ml as subcutaneous infusion with insulin pump.  The patient denies any known family history of pancreatic or thyroid cancer.    The patient asks about medical management for hair loss.     The patient states that anxiety symptoms are well controlled despite not being on medication.  [facility].    The patient denies any abdominal pain or bowel problems.      Review of Systems   Gastrointestinal:  Negative for abdominal pain, constipation and diarrhea.   Musculoskeletal:  Positive for neck pain.   Skin:         Positive for hair loss.   Psychiatric/Behavioral:  The patient is not nervous/anxious.        Objective   Physical Exam  Constitutional:       Appearance: Normal appearance.   Neck:      Vascular: No carotid bruit.   Cardiovascular:      Rate and Rhythm: Normal rate and regular rhythm.      Heart sounds: Normal heart sounds.   Pulmonary:      Effort: Pulmonary effort is normal.      Breath sounds: Normal breath sounds.   Abdominal:      General: Bowel sounds are normal.      Palpations: Abdomen is soft.      Tenderness: There is no abdominal tenderness.   Skin:     General: Skin is warm and dry.   Neurological:      General: No focal  deficit present.      Mental Status: He is alert and oriented to person, place, and time. Mental status is at baseline.   Psychiatric:         Mood and Affect: Mood normal.         Behavior: Behavior normal.         Assessment/Plan   Problem List Items Addressed This Visit             ICD-10-CM    ADHD F90.9    Relevant Medications    amphetamine-dextroamphetamine XR (Adderall XR) 20 mg 24 hr capsule     Other Visit Diagnoses         Codes    Healthcare maintenance    -  Primary Z00.00    Relevant Orders    Hemoglobin A1c    CBC and Auto Differential    Comprehensive metabolic panel    Lipid panel    Albumin-Creatinine Ratio, Urine Random    Opiate/Opioid/Benzo Prescription Compliance    Muscle spasm     M62.838    Relevant Orders    Referral to Physical Therapy            IMPRESSIONS/PLAN:     ADHD  -  Increased Adderall XR to 20mg QAM.  Discussed adverse effect profile of Adderall and therapeutic expectations.  Advised him not to consume pre-workout products due to caffeine.  Previously on bupropion 150mg QD.  [facility].  Drug screen ordered.        DM I   - Last HbA1c 6.6% per 5/2023.  Continue with NovoLog 100 unit/ml as subcutaneous infusion with insulin pump delivering up to 40 units per day.  Followed with  from Endocrinology.  Ordered Urine for Albumin-Creatinine ratio and serum for HbA1c.    Neck Spasm  - Ordered Physical Therapy.  Call the clinic if symptoms persist or worsen, and will consider medical management.     Hair Loss  - Discussed therapeutic expectations and adverse effect profiles of a number of different medications.  Patient agreed to try topical minoxidil for time being.      /70 in the office today.     Anxiety   - Doing well.  Not currently on any medications.     Health Maintenance   - Routine labs ordered including CBC, CMP, and a lipid panel to be completed in the fasting state.   Patient received Influenza vaccine in the  clinic today, tolerated well.      Follow-up in 6 months, call sooner if needed.        Scribe Attestation  By signing my name below, I, Torin Lott   attest that this documentation has been prepared under the direction and in the presence of Vj Moody DO.   Kait Andrew 09/04/24 3:01 PM

## 2024-09-12 ENCOUNTER — APPOINTMENT (OUTPATIENT)
Dept: PRIMARY CARE | Facility: CLINIC | Age: 24
End: 2024-09-12
Payer: COMMERCIAL

## 2024-09-16 PROCEDURE — RXMED WILLOW AMBULATORY MEDICATION CHARGE

## 2024-09-20 ENCOUNTER — APPOINTMENT (OUTPATIENT)
Dept: PRIMARY CARE | Facility: CLINIC | Age: 24
End: 2024-09-20
Payer: COMMERCIAL

## 2024-09-20 ENCOUNTER — PHARMACY VISIT (OUTPATIENT)
Dept: PHARMACY | Facility: CLINIC | Age: 24
End: 2024-09-20
Payer: COMMERCIAL

## 2024-09-23 DIAGNOSIS — E10.69 TYPE 1 DIABETES MELLITUS WITH OTHER SPECIFIED COMPLICATION: ICD-10-CM

## 2024-09-23 PROCEDURE — RXMED WILLOW AMBULATORY MEDICATION CHARGE

## 2024-09-23 RX ORDER — BLOOD-GLUCOSE,RECEIVER,CONT
EACH MISCELLANEOUS
Qty: 1 EACH | Refills: 0 | OUTPATIENT
Start: 2024-09-23

## 2024-09-23 NOTE — TELEPHONE ENCOUNTER
I am denying the refill request for Dexcom G7  due to patient not being seen recently and no follow up scheduled. Receivers typically do not need refills. 90 day supply of sensors were recently dispensed on 9/20/24.

## 2024-09-24 ENCOUNTER — PHARMACY VISIT (OUTPATIENT)
Dept: PHARMACY | Facility: CLINIC | Age: 24
End: 2024-09-24
Payer: COMMERCIAL

## 2024-09-27 ENCOUNTER — SOCIAL WORK (OUTPATIENT)
Dept: PRIMARY CARE | Facility: CLINIC | Age: 24
End: 2024-09-27
Payer: COMMERCIAL

## 2024-09-27 ENCOUNTER — DOCUMENTATION (OUTPATIENT)
Dept: PRIMARY CARE | Facility: CLINIC | Age: 24
End: 2024-09-27

## 2024-09-27 DIAGNOSIS — F41.9 ANXIETY: Primary | ICD-10-CM

## 2024-09-27 PROCEDURE — RXMED WILLOW AMBULATORY MEDICATION CHARGE

## 2024-09-27 ASSESSMENT — PATIENT HEALTH QUESTIONNAIRE - PHQ9
4. FEELING TIRED OR HAVING LITTLE ENERGY: NOT AT ALL
6. FEELING BAD ABOUT YOURSELF - OR THAT YOU ARE A FAILURE OR HAVE LET YOURSELF OR YOUR FAMILY DOWN: NOT AT ALL
1. LITTLE INTEREST OR PLEASURE IN DOING THINGS: MORE THAN HALF THE DAYS
8. MOVING OR SPEAKING SO SLOWLY THAT OTHER PEOPLE COULD HAVE NOTICED. OR THE OPPOSITE, BEING SO FIGETY OR RESTLESS THAT YOU HAVE BEEN MOVING AROUND A LOT MORE THAN USUAL: NOT AT ALL
SUM OF ALL RESPONSES TO PHQ9 QUESTIONS 1 & 2: 2
9. THOUGHTS THAT YOU WOULD BE BETTER OFF DEAD, OR OF HURTING YOURSELF: NOT AT ALL
10. IF YOU CHECKED OFF ANY PROBLEMS, HOW DIFFICULT HAVE THESE PROBLEMS MADE IT FOR YOU TO DO YOUR WORK, TAKE CARE OF THINGS AT HOME, OR GET ALONG WITH OTHER PEOPLE: NOT DIFFICULT AT ALL
3. TROUBLE FALLING OR STAYING ASLEEP: SEVERAL DAYS
5. POOR APPETITE OR OVEREATING: NOT AT ALL
7. TROUBLE CONCENTRATING ON THINGS, SUCH AS READING THE NEWSPAPER OR WATCHING TELEVISION: NOT AT ALL
SUM OF ALL RESPONSES TO PHQ QUESTIONS 1-9: 3
2. FEELING DOWN, DEPRESSED OR HOPELESS: NOT AT ALL

## 2024-09-27 ASSESSMENT — ANXIETY QUESTIONNAIRES
1. FEELING NERVOUS, ANXIOUS, OR ON EDGE: NOT AT ALL
4. TROUBLE RELAXING: NOT AT ALL
IF YOU CHECKED OFF ANY PROBLEMS ON THIS QUESTIONNAIRE, HOW DIFFICULT HAVE THESE PROBLEMS MADE IT FOR YOU TO DO YOUR WORK, TAKE CARE OF THINGS AT HOME, OR GET ALONG WITH OTHER PEOPLE: NOT DIFFICULT AT ALL
7. FEELING AFRAID AS IF SOMETHING AWFUL MIGHT HAPPEN: NOT AT ALL
6. BECOMING EASILY ANNOYED OR IRRITABLE: NOT AT ALL
3. WORRYING TOO MUCH ABOUT DIFFERENT THINGS: NOT AT ALL
5. BEING SO RESTLESS THAT IT IS HARD TO SIT STILL: NOT AT ALL
2. NOT BEING ABLE TO STOP OR CONTROL WORRYING: NOT AT ALL
GAD7 TOTAL SCORE: 0

## 2024-09-27 NOTE — PROGRESS NOTES
"Collaborative Care (Co[facility])  Progress Note    Type of Interaction: Virtual    Start Time: 1:01 PM    End Time: 1:31 PM        Appointment: Scheduled    Reason for Visit:   Chief Complaint   Patient presents with    Anxiety    ADHD    Follow-up         Interval History / Patient Symptoms:     Patient Health Questionnaire-9 Score: 3 (9/27/2024  1:27 PM)  ANNE-7 Total Score: 0 (9/27/2024  1:29 PM)    [facility] met with pt for scheduled virtual session. Pt reported he has been very busy at work and recently moved. Pt reported he went on a date recently as well. Pt reported he was proud of himself for how he managed that situation. Pt reported he feels like he is in a good place right now and he feels \"mostly positive feelings\" about his current life situation. [facility] reviewed pt goals and progress throughout session. Pt has made progress towards treatment goals identified throughout treatment. [facility] will transition pt treatment status to graduated due to the same, and was instructed to contact his PCP if he needs CoCM services again in the future. Pt voiced understanding.     Interventions Provided: Review Progress/Goals Stress Management      Progress Made: Significant    Response to Intervention: Pt was engaged throughout session and was receptive towards [facility] feedback and interventions provided.     Plan:     -Pt treatment status transitioned to graduated, was instructed to contact his PCP if future CoCM services are needed.    Follow Up / Next Appointment: N/A      "

## 2024-09-28 ENCOUNTER — PHARMACY VISIT (OUTPATIENT)
Dept: PHARMACY | Facility: CLINIC | Age: 24
End: 2024-09-28
Payer: COMMERCIAL

## 2024-10-14 ENCOUNTER — APPOINTMENT (OUTPATIENT)
Dept: PHYSICAL THERAPY | Facility: CLINIC | Age: 24
End: 2024-10-14
Payer: COMMERCIAL

## 2024-11-07 DIAGNOSIS — E10.9 TYPE 1 DIABETES MELLITUS WITHOUT COMPLICATION: Primary | ICD-10-CM

## 2024-11-07 RX ORDER — INSULIN PMP CART,AUT,G6/7,CNTR
1 EACH SUBCUTANEOUS
Qty: 30 EACH | Refills: 3 | Status: SHIPPED | OUTPATIENT
Start: 2024-11-07

## 2024-11-12 DIAGNOSIS — F90.9 ATTENTION DEFICIT HYPERACTIVITY DISORDER (ADHD), UNSPECIFIED ADHD TYPE: ICD-10-CM

## 2024-11-12 RX ORDER — DEXTROAMPHETAMINE SACCHARATE, AMPHETAMINE ASPARTATE MONOHYDRATE, DEXTROAMPHETAMINE SULFATE AND AMPHETAMINE SULFATE 5; 5; 5; 5 MG/1; MG/1; MG/1; MG/1
20 CAPSULE, EXTENDED RELEASE ORAL EVERY MORNING
Qty: 30 CAPSULE | Refills: 0 | Status: SHIPPED | OUTPATIENT
Start: 2024-11-12 | End: 2024-12-12

## 2024-11-14 DIAGNOSIS — B00.1 RECURRENT COLD SORES: ICD-10-CM

## 2024-11-14 PROCEDURE — RXMED WILLOW AMBULATORY MEDICATION CHARGE

## 2024-11-14 RX ORDER — VALACYCLOVIR HYDROCHLORIDE 1 G/1
TABLET, FILM COATED ORAL
Qty: 4 TABLET | Refills: 1 | Status: SHIPPED | OUTPATIENT
Start: 2024-11-14 | End: 2025-11-14

## 2024-11-18 PROCEDURE — RXMED WILLOW AMBULATORY MEDICATION CHARGE

## 2024-11-19 ENCOUNTER — PHARMACY VISIT (OUTPATIENT)
Dept: PHARMACY | Facility: CLINIC | Age: 24
End: 2024-11-19
Payer: COMMERCIAL

## 2024-11-25 ENCOUNTER — PHARMACY VISIT (OUTPATIENT)
Dept: PHARMACY | Facility: CLINIC | Age: 24
End: 2024-11-25
Payer: COMMERCIAL

## 2024-11-25 PROCEDURE — RXMED WILLOW AMBULATORY MEDICATION CHARGE

## 2024-12-16 DIAGNOSIS — E10.9 TYPE 1 DIABETES MELLITUS WITHOUT COMPLICATION: ICD-10-CM

## 2024-12-16 DIAGNOSIS — E10.69 TYPE 1 DIABETES MELLITUS WITH OTHER SPECIFIED COMPLICATION: ICD-10-CM

## 2024-12-16 PROCEDURE — RXMED WILLOW AMBULATORY MEDICATION CHARGE

## 2024-12-16 RX ORDER — BLOOD-GLUCOSE SENSOR
EACH MISCELLANEOUS
Qty: 9 EACH | Refills: 1 | Status: SHIPPED | OUTPATIENT
Start: 2024-12-16 | End: 2024-12-20 | Stop reason: ALTCHOICE

## 2024-12-16 RX ORDER — BLOOD-GLUCOSE SENSOR
EACH MISCELLANEOUS
Qty: 9 EACH | Refills: 1 | Status: CANCELLED | OUTPATIENT
Start: 2024-12-16

## 2024-12-16 RX ORDER — INSULIN LISPRO 100 [IU]/ML
INJECTION, SOLUTION INTRAVENOUS; SUBCUTANEOUS
Qty: 40 ML | Refills: 0 | Status: CANCELLED | OUTPATIENT
Start: 2024-12-16

## 2024-12-16 RX ORDER — INSULIN LISPRO 100 [IU]/ML
INJECTION, SOLUTION INTRAVENOUS; SUBCUTANEOUS
Qty: 40 ML | Refills: 0 | OUTPATIENT
Start: 2024-12-16

## 2024-12-18 ENCOUNTER — TELEPHONE (OUTPATIENT)
Dept: ENDOCRINOLOGY | Facility: CLINIC | Age: 24
End: 2024-12-18
Payer: COMMERCIAL

## 2024-12-18 NOTE — TELEPHONE ENCOUNTER
Patient is scheduled for 12/20/2024 at 8:45 am with Gina Easley, voice mail and my chart message left for patient.

## 2024-12-20 ENCOUNTER — OFFICE VISIT (OUTPATIENT)
Dept: ENDOCRINOLOGY | Facility: CLINIC | Age: 24
End: 2024-12-20
Payer: COMMERCIAL

## 2024-12-20 VITALS
BODY MASS INDEX: 25.77 KG/M2 | HEIGHT: 70 IN | WEIGHT: 180 LBS | DIASTOLIC BLOOD PRESSURE: 85 MMHG | HEART RATE: 62 BPM | SYSTOLIC BLOOD PRESSURE: 142 MMHG

## 2024-12-20 DIAGNOSIS — Z00.00 HEALTH CARE MAINTENANCE: Primary | ICD-10-CM

## 2024-12-20 DIAGNOSIS — F90.9 ATTENTION DEFICIT HYPERACTIVITY DISORDER (ADHD), UNSPECIFIED ADHD TYPE: ICD-10-CM

## 2024-12-20 DIAGNOSIS — E10.65 TYPE 1 DIABETES MELLITUS WITH HYPERGLYCEMIA (MULTI): ICD-10-CM

## 2024-12-20 LAB — POC HEMOGLOBIN A1C: 6.4 % (ref 4.2–6.5)

## 2024-12-20 PROCEDURE — RXMED WILLOW AMBULATORY MEDICATION CHARGE

## 2024-12-20 RX ORDER — DEXTROAMPHETAMINE SACCHARATE, AMPHETAMINE ASPARTATE MONOHYDRATE, DEXTROAMPHETAMINE SULFATE AND AMPHETAMINE SULFATE 5; 5; 5; 5 MG/1; MG/1; MG/1; MG/1
20 CAPSULE, EXTENDED RELEASE ORAL EVERY MORNING
Qty: 30 CAPSULE | Refills: 0 | Status: SHIPPED | OUTPATIENT
Start: 2024-12-20 | End: 2025-01-19

## 2024-12-20 RX ORDER — BLOOD-GLUCOSE TRANSMITTER
EACH MISCELLANEOUS
Qty: 1 EACH | Refills: 3 | Status: SHIPPED | OUTPATIENT
Start: 2024-12-20

## 2024-12-20 RX ORDER — BLOOD-GLUCOSE SENSOR
EACH MISCELLANEOUS
Qty: 9 EACH | Refills: 3 | Status: SHIPPED | OUTPATIENT
Start: 2024-12-20

## 2024-12-20 ASSESSMENT — PATIENT HEALTH QUESTIONNAIRE - PHQ9
SUM OF ALL RESPONSES TO PHQ9 QUESTIONS 1 AND 2: 0
1. LITTLE INTEREST OR PLEASURE IN DOING THINGS: NOT AT ALL
2. FEELING DOWN, DEPRESSED OR HOPELESS: NOT AT ALL

## 2024-12-20 ASSESSMENT — ENCOUNTER SYMPTOMS
DEPRESSION: 0
LOSS OF SENSATION IN FEET: 0
OCCASIONAL FEELINGS OF UNSTEADINESS: 0

## 2024-12-20 NOTE — ASSESSMENT & PLAN NOTE
Orders:    Dexcom G6 Transmitter device; Change every 90 days    Dexcom G6 Sensor device; Use to monitor blood sugar and change every 10 days    TSH with reflex to Free T4 if abnormal; Future    Diabetic Retinopathy [employer]

## 2024-12-20 NOTE — PATIENT INSTRUCTIONS
Switch back to Dexcom G6 that way you can use your phone and automation      Get retina screening today     Get updated labs and urine     Follow up 6 months- 1 year

## 2024-12-20 NOTE — PROGRESS NOTES
"Subjective   Jimbo Kramer is a 24 y.o. male presents today for a follow up of Type 1 diabetes. Initial diagnosis with diabetes was at age 20.   Known complications include: none    Patient of Dr. Abad and last seen by her 4/2023   This is the first visit with me   A1c 6.4% today.  Previous A1c 6.6% in 5/2023.     Since last visit, he does not report any changes  He upgraded to Dexcom G7 however he is running in manual mode since he is using his phone not PDM       Current diabetes regimen is as follows:   Novolog per Omnipod 5       Patient is using continuous glucose monitor - Dexcom G7  Not connected to office   The patient is currently checking the blood glucose as needed.    Hypoglycemia frequency: Couple times per week   Hypoglycemia awareness: yes     The patient comes into the office today with variable blood sugars. Reports not eating much during the day.  Will spike with his meal         ROS  General: no fever, chills or acute changes in weight in the last 6 months  Skin: no rashes, pruritis or dry skin  Cardiac: denies chest pain, heart palpitations or orthopnea  Pulmonary: denies wheezing, productive cough or exertional dyspnea      Objective    Physical Exam  Blood pressure 142/85, pulse 62, height 1.778 m (5' 10\"), weight 81.6 kg (180 lb).  General: not in acute distress, cooperative   Respiratory: normal respiratory effort  Musculoskeletal: normal gait       Current Outpatient Medications:     Accu-Chek Guide Me Glucose Mtr misc, , Disp: , Rfl:     amphetamine-dextroamphetamine (AdderalL) 5 mg tablet, Take 1 tablet (5 mg) by mouth once daily as needed (Focus)., Disp: 30 tablet, Rfl: 0    amphetamine-dextroamphetamine XR (Adderall XR) 20 mg 24 hr capsule, Take 1 capsule (20 mg) by mouth once daily in the morning. Do not crush or chew., Disp: 30 capsule, Rfl: 0    blood sugar diagnostic (Accu-Chek Guide test strips) strip, Use to test blood sugar up to 4 times daily as needed, Disp: 100 strip, " Rfl: 11    blood-glucose meter,continuous misc, Use as directed, Disp: 1 each, Rfl: 0    Dexcom G7 Sensor device, Use as directed to monitor blood sugar and change every 10 days., Disp: 9 each, Rfl: 1    insulin aspart (NovoLOG U-100 Insulin aspart) 100 unit/mL injection, Inject under the skin 3 times a day before meals. Take as directed per insulin instructions., Disp: , Rfl:     insulin lispro (HumaLOG U-100 Insulin) 100 unit/mL injection, FOR CONTINUOUS SUBCUTANEOUS INFUSION WITH INSULIN PUMP (UP TO 40 UNITS PER DAY). Must schedule follow up for further refills. (Patient not taking: Reported on 9/4/2024), Disp: 40 mL, Rfl: 0    insulin pump cart,auto,BT,G6/7 (Omnipod 5 G6-G7 Pods, Gen 5,) cartridge, Inject 1 each under the skin every 3 days., Disp: 30 each, Rfl: 3    lancets (Accu-Chek Softclix Lancets) misc, Use to test blood sugar up to 4 times daily as needed, Disp: 100 each, Rfl: 11    valACYclovir (Valtrex) 1 gram tablet, TAKE 2 TABLETS (2,000 MG) BY MOUTH 2 TIMES A DAY FOR 1 DAY., Disp: 4 tablet, Rfl: 1    Lab Results   Component Value Date    BILITOT 0.4 05/26/2023    CALCIUM 10.2 05/26/2023    CO2 29 05/26/2023     05/26/2023    CREATININE 0.81 05/26/2023    GLUCOSE 226 (H) 05/26/2023    ALKPHOS 57 05/26/2023    K 4.5 05/26/2023    PROT 7.4 05/26/2023     05/26/2023    AST 15 05/26/2023    ALT 19 05/26/2023    BUN 23 05/26/2023    ANIONGAP 12 05/26/2023    MG 2.10 07/03/2020    ALBUMIN 4.5 05/26/2023    GFRMALE >90 05/26/2023     Lab Results   Component Value Date    TRIG 76 02/10/2022    CHOL 184 02/10/2022    HDL 47.0 02/10/2022     Lab Results   Component Value Date    HGBA1C 6.6 (A) 05/26/2023    HGBA1C 7.1 (A) 02/10/2022    HGBA1C 13.2 07/04/2020       The ASCVD Risk score (Saleem DURAN, et al., 2019) failed to calculate for the following reasons:    The 2019 ASCVD risk score is only valid for ages 40 to 79      Assessment & Plan  Type 1 diabetes mellitus with hyperglycemia  (Multi)    Orders:    Dexcom G6 Transmitter device; Change every 90 days    Dexcom G6 Sensor device; Use to monitor blood sugar and change every 10 days    TSH with reflex to Free T4 if abnormal; Future    Diabetic Retinopathy Luminetics    Comment: A1c at goal however he is having lows.  This may be falsely lower due to lows.  Unfortunately he is not using automation and running in manual mode as he prefers to bolus from his phone.  Recommend going back to G6 and that way he can be integrated but also use his phone.  Unable to make changes today.  Will get back into automation and hoping TIR will improve.  Discussed HM and will get retina screening.      Plan:   Switch back to Dexcom G6 that way you can use your phone and automation    Get retina screening today   Get updated labs and urine   Follow up 6 months- 1 year

## 2024-12-24 ENCOUNTER — PHARMACY VISIT (OUTPATIENT)
Dept: PHARMACY | Facility: CLINIC | Age: 24
End: 2024-12-24
Payer: COMMERCIAL

## 2024-12-28 ENCOUNTER — PATIENT MESSAGE (OUTPATIENT)
Dept: PRIMARY CARE | Facility: CLINIC | Age: 24
End: 2024-12-28
Payer: COMMERCIAL

## 2024-12-28 DIAGNOSIS — M25.531 RIGHT WRIST PAIN: Primary | ICD-10-CM

## 2025-01-07 ENCOUNTER — HOSPITAL ENCOUNTER (OUTPATIENT)
Dept: RADIOLOGY | Facility: EXTERNAL LOCATION | Age: 25
Discharge: HOME | End: 2025-01-07

## 2025-01-07 ENCOUNTER — APPOINTMENT (OUTPATIENT)
Dept: ORTHOPEDIC SURGERY | Facility: CLINIC | Age: 25
End: 2025-01-07
Payer: COMMERCIAL

## 2025-01-07 VITALS — BODY MASS INDEX: 25.77 KG/M2 | WEIGHT: 180 LBS | HEIGHT: 70 IN

## 2025-01-07 DIAGNOSIS — M25.531 RIGHT WRIST PAIN: ICD-10-CM

## 2025-01-07 DIAGNOSIS — M65.4 DE QUERVAIN'S TENOSYNOVITIS, RIGHT: Primary | ICD-10-CM

## 2025-01-07 PROCEDURE — L3809 WHFO W/O JOINTS PRE OTS: HCPCS | Performed by: FAMILY MEDICINE

## 2025-01-07 PROCEDURE — 1036F TOBACCO NON-USER: CPT | Performed by: FAMILY MEDICINE

## 2025-01-07 PROCEDURE — 99203 OFFICE O/P NEW LOW 30 MIN: CPT | Performed by: FAMILY MEDICINE

## 2025-01-07 PROCEDURE — 3008F BODY MASS INDEX DOCD: CPT | Performed by: FAMILY MEDICINE

## 2025-01-07 PROCEDURE — 76942 ECHO GUIDE FOR BIOPSY: CPT | Performed by: FAMILY MEDICINE

## 2025-01-07 PROCEDURE — 20550 NJX 1 TENDON SHEATH/LIGAMENT: CPT | Performed by: FAMILY MEDICINE

## 2025-01-07 RX ORDER — LIDOCAINE HYDROCHLORIDE 20 MG/ML
1 INJECTION, SOLUTION INFILTRATION; PERINEURAL
Status: COMPLETED | OUTPATIENT
Start: 2025-01-07 | End: 2025-01-07

## 2025-01-07 RX ORDER — TRIAMCINOLONE ACETONIDE 40 MG/ML
40 INJECTION, SUSPENSION INTRA-ARTICULAR; INTRAMUSCULAR
Status: COMPLETED | OUTPATIENT
Start: 2025-01-07 | End: 2025-01-07

## 2025-01-07 RX ADMIN — LIDOCAINE HYDROCHLORIDE 1 ML: 20 INJECTION, SOLUTION INFILTRATION; PERINEURAL at 09:05

## 2025-01-07 RX ADMIN — TRIAMCINOLONE ACETONIDE 40 MG: 40 INJECTION, SUSPENSION INTRA-ARTICULAR; INTRAMUSCULAR at 09:05

## 2025-01-07 NOTE — PROGRESS NOTES
History of Present Illness   Chief Complaint   Patient presents with    Right Wrist - Pain     Nki  Pain x 1 month  -swelling  -n/t       The patient is 24 y.o. and dominant male  here with a complaint of right wrist pain.  Onset of symptoms over the past month, atraumatic in nature.  He points to the more radial aspect of his wrist as area of discomfort.  He did get an over-the-counter wrist brace which she has been wearing which does help some but still has some discomfort in the brace, it does not immobilize his thumb.  He says that he is a friend who is a DO who examined his wrist and thought he likely had de Quervain's tenosynovitis.  Recommended outpatient orthopedic follow-up.  He works as a , he is active in the gym, he has been having to modify his activities because of some of the pain in his wrist.  He denies any history of any similar symptoms that he can recall.  He denies any swelling, no numbness or tingling.  He is not taking any medications for pain.    No past medical history on file.    Medication Documentation Review Audit       Reviewed by BETSY Baer (Nurse Practitioner) on 24 at 0925      Medication Order Taking? Sig Documenting Provider Last Dose Status   Accu-Chek Guide Me Glucose Mtr misc [id number] Yes  Historical Provider, MD Taking Active   amphetamine-dextroamphetamine (AdderalL) 5 mg tablet [id number] No Take 1 tablet (5 mg) by mouth once daily as needed (Focus). Vj Moody DO Taking  24 9052   amphetamine-dextroamphetamine XR (Adderall XR) 20 mg 24 hr capsule [id number]  Take 1 capsule (20 mg) by mouth once daily in the morning. Do not crush or chew. Vj Moody DO  Active   blood sugar diagnostic (Accu-Chek Guide test strips) strip [id number] Yes Use to test blood sugar up to 4 times daily as needed Marzena Abad MD Taking Active   blood-glucose meter,continuous misc [id number] Yes Use as directed Marzena Abad MD  Active    Dexcom G6 Sensor device [id number]  Change every 10 days BETSY Baer  Active   Dexcom G6 Transmitter device [id number]  Change every 90 days BETSY Baer  Active   Discontinued 12/16/24 1457    Discontinued 12/20/24 0916   insulin aspart (NovoLOG U-100 Insulin aspart) 100 unit/mL injection [id number] Yes Inject under the skin 3 times a day before meals. Take as directed per insulin instructions. Historical Provider, MD Taking Active   Discontinued 12/20/24 0914   insulin pump cart,auto,BT,G6/7 (Omnipod 5 G6-G7 Pods, Gen 5,) cartridge [id number] Yes Inject 1 each under the skin every 3 days. Marzena Abad MD  Active   lancets (Accu-Chek Softclix Lancets) misc [id number] Yes Use to test blood sugar up to 4 times daily as needed Marzena Abad MD Taking Active   valACYclovir (Valtrex) 1 gram tablet [id number] Yes TAKE 2 TABLETS (2,000 MG) BY MOUTH 2 TIMES A DAY FOR 1 DAY. Vj Moody, DO  Active                    No Known Allergies    Social History     Socioeconomic History    Marital status: Single     Spouse name: Not on file    Number of children: Not on file    Years of education: Not on file    Highest education level: Not on file   Occupational History    Not on file   Tobacco Use    Smoking status: Never     Passive exposure: Never    Smokeless tobacco: Never   Vaping Use    Vaping status: Never Used   Substance and Sexual Activity    Alcohol use: Never    Drug use: Never    Sexual activity: Not on file   Other Topics Concern    Not on file   Social History Narrative    Not on file     Social Drivers of Health     Financial Resource Strain: Not on file   Food Insecurity: Not on file   Transportation Needs: Not on file   Physical Activity: Not on file   Stress: Not on file   Social Connections: Not on file   Intimate Partner Violence: Not on file   Housing Stability: Not on file       Past Surgical History:   Procedure Laterality Date    OTHER SURGICAL HISTORY   02/02/2022    No history of surgery          Review of Systems   GENERAL: Negative  GI: Negative  MUSCULOSKELETAL: See HPI  SKIN: Negative  NEURO:  Negative     Physical Exam:    General/Constitutional: well appearing, no distress, appears stated age  HEENT: sclera clear  Respiratory: non labored breathing  Vascular: No edema, swelling or tenderness, except as noted in detailed exam.  Integumentary: No impressive skin lesions present, except as noted in detailed exam.  Neurological:  Alert and oriented   Psychological:  Normal mood and affect.  Musculoskeletal: Normal, except as noted in detailed exam and in HPI    Right wrist: Normal appearance, no skin changes, no swelling, no redness or warmth.  He is tender at the first dorsal compartment near the radial styloid, no other areas of tenderness at the wrist.  He has good range of motion at the wrist with flexion and extension with minimal discomfort, there is discomfort with ulnar deviation.  No motor deficits present at the wrist, there is no pain with strength testing at the wrist, there is pain with resisted thumb extension.  He has a positive Finkelstein test.  There is no motor or sensory deficits at the median, ulnar, radial nerve distribution.       Imaging: No imaging today    Hand / UE Inj/Asp: R extensor compartment 1 for de Quervain's tenosynovitis on 1/7/2025 9:05 AM  Indications: pain  Details: 22 G needle, ultrasound-guided  Medications: 40 mg triamcinolone acetonide 40 mg/mL; 1 mL lidocaine 20 mg/mL (2 %)  Outcome: tolerated well, no immediate complications    Right 1st dorsal compartment and surrounding structures were appropriately visualized before and during injection    Ultrasound images were permanently uploaded to the medical record/PACS  Procedure, treatment alternatives, risks and benefits explained, specific risks discussed. Consent was given by the patient. Immediately prior to procedure a time out was called to verify the correct patient,  procedure, equipment, support staff and site/side marked as required. Patient was prepped and draped in the usual sterile fashion.               Assessment   1. De Quervain's tenosynovitis, right        2. Right wrist pain  Referral to Orthopaedic Surgery    Point of Care Ultrasound            Plan: Discussed diagnosis, further workup and treatment.  We did proceed with ultrasound-guided first dorsal compartment injection with Kenalog today, patient tolerated without issue, he was also fitted for a thumb spica Futuro brace that he will wear for the next 1 to 2 weeks, he can wean out of this as symptoms allow.  He will progress back to working out, activities as tolerated by symptoms.  He will follow-up as symptoms dictate.

## 2025-01-08 PROCEDURE — RXMED WILLOW AMBULATORY MEDICATION CHARGE

## 2025-01-10 ENCOUNTER — PHARMACY VISIT (OUTPATIENT)
Dept: PHARMACY | Facility: CLINIC | Age: 25
End: 2025-01-10
Payer: MEDICARE

## 2025-01-24 DIAGNOSIS — F90.9 ATTENTION DEFICIT HYPERACTIVITY DISORDER (ADHD), UNSPECIFIED ADHD TYPE: ICD-10-CM

## 2025-01-24 RX ORDER — DEXTROAMPHETAMINE SACCHARATE, AMPHETAMINE ASPARTATE MONOHYDRATE, DEXTROAMPHETAMINE SULFATE AND AMPHETAMINE SULFATE 5; 5; 5; 5 MG/1; MG/1; MG/1; MG/1
20 CAPSULE, EXTENDED RELEASE ORAL EVERY MORNING
Qty: 30 CAPSULE | Refills: 0 | Status: SHIPPED | OUTPATIENT
Start: 2025-01-24 | End: 2025-02-23

## 2025-01-30 PROCEDURE — RXMED WILLOW AMBULATORY MEDICATION CHARGE

## 2025-01-31 ENCOUNTER — APPOINTMENT (OUTPATIENT)
Dept: PHARMACY | Facility: HOSPITAL | Age: 25
End: 2025-01-31
Payer: COMMERCIAL

## 2025-02-01 ENCOUNTER — PHARMACY VISIT (OUTPATIENT)
Dept: PHARMACY | Facility: CLINIC | Age: 25
End: 2025-02-01
Payer: MEDICARE

## 2025-02-27 DIAGNOSIS — F90.9 ATTENTION DEFICIT HYPERACTIVITY DISORDER (ADHD), UNSPECIFIED ADHD TYPE: ICD-10-CM

## 2025-02-27 RX ORDER — DEXTROAMPHETAMINE SACCHARATE, AMPHETAMINE ASPARTATE MONOHYDRATE, DEXTROAMPHETAMINE SULFATE AND AMPHETAMINE SULFATE 5; 5; 5; 5 MG/1; MG/1; MG/1; MG/1
20 CAPSULE, EXTENDED RELEASE ORAL EVERY MORNING
Qty: 30 CAPSULE | Refills: 0 | Status: SHIPPED | OUTPATIENT
Start: 2025-02-27 | End: 2025-03-29

## 2025-03-07 ENCOUNTER — APPOINTMENT (OUTPATIENT)
Dept: PRIMARY CARE | Facility: CLINIC | Age: 25
End: 2025-03-07
Payer: COMMERCIAL

## 2025-03-07 DIAGNOSIS — R41.840 DIFFICULTY CONCENTRATING: ICD-10-CM

## 2025-03-07 DIAGNOSIS — F90.9 ATTENTION DEFICIT HYPERACTIVITY DISORDER (ADHD), UNSPECIFIED ADHD TYPE: ICD-10-CM

## 2025-03-07 PROCEDURE — 1036F TOBACCO NON-USER: CPT | Performed by: INTERNAL MEDICINE

## 2025-03-07 PROCEDURE — 99213 OFFICE O/P EST LOW 20 MIN: CPT | Performed by: INTERNAL MEDICINE

## 2025-03-07 RX ORDER — DEXTROAMPHETAMINE SACCHARATE, AMPHETAMINE ASPARTATE MONOHYDRATE, DEXTROAMPHETAMINE SULFATE AND AMPHETAMINE SULFATE 5; 5; 5; 5 MG/1; MG/1; MG/1; MG/1
20 CAPSULE, EXTENDED RELEASE ORAL EVERY MORNING
Qty: 30 CAPSULE | Refills: 0 | Status: SHIPPED | OUTPATIENT
Start: 2025-03-07 | End: 2025-04-06

## 2025-03-07 RX ORDER — DEXTROAMPHETAMINE SACCHARATE, AMPHETAMINE ASPARTATE, DEXTROAMPHETAMINE SULFATE AND AMPHETAMINE SULFATE 1.25; 1.25; 1.25; 1.25 MG/1; MG/1; MG/1; MG/1
5 TABLET ORAL DAILY PRN
Qty: 30 TABLET | Refills: 0 | Status: SHIPPED | OUTPATIENT
Start: 2025-03-07 | End: 2025-04-06

## 2025-03-07 NOTE — TELEPHONE ENCOUNTER
Patient wanted the xray to be sent to the [facility] in [place]. Sent medication for approval.

## 2025-03-07 NOTE — PROGRESS NOTES
Patient doing a virtual visit for a follow up on medications.  Virtual or Telephone Consent    An interactive audio and video telecommunication system which permits real time communications between the patient (at the originating site) and provider (at the distant site) was utilized to provide this telehealth service.   Verbal consent was requested and obtained from Jimbo Kramer on this date, 03/07/25 for a telehealth visit and the patient's location was confirmed at the time of the visit.     Subjective   Patient ID: Jimbo Kramer is a 24 y.o. male who presents for Follow-up.    The patient has been taking Adderall XR 20mg once daily, and is tolerating the medication well.  Nevertheless, he has been noticing that the benefit of the medication seems to subside around 1pm to 2pm, as he normally starts his day at 5am.  The patient inquires about additional management options.      Review of Systems   All other systems reviewed and are negative.    Objective   Physical Exam  Comfortable appearing.  In NAD.    Assessment/Plan   Problem List Items Addressed This Visit    None  Visit Diagnoses         Codes    Difficulty concentrating     R41.840              IMPRESSIONS/PLAN:       ADHD  -  Prescribed Adderall 5mg to be taken once daily in the afternoon for additional focus.  Continue Adderall XR to 20mg QAM.  Discussed adverse effect profile of Adderall and therapeutic expectations.  Advised him not to consume pre-workout products due to caffeine.  Previously on bupropion 150mg QD.  [facility].  Drug screen previously ordered.      DM I   - Last HbA1c 6.4% per 12/2024.  Continue with NovoLog 100 unit/ml as subcutaneous infusion with insulin pump delivering up to 40 units per day.  Followed with  from Endocrinology.  Previously ordered Urine for Albumin-Creatinine ratio.    Anxiety   - Doing well.  Not currently on any medications.    Neck Spasm  - Previously  ordered Physical Therapy.  Call the clinic if symptoms persist or worsen, and will consider medical management.      Hair Loss  - Discussed therapeutic expectations and adverse effect profiles of a number of different medications.  Patient agreed to try topical minoxidil for time being.      Health Maintenance   - Routine labs previously ordered including CBC, CMP, and a lipid panel to be completed in the fasting state.    Follow-up in 6 months, call sooner if needed.        Scribe Attestation  By signing my name below, I, Torin Lott   attest that this documentation has been prepared under the direction and in the presence of Vj Moody DO.   Kait Andrew 03/07/25 10:31 AM

## 2025-03-07 NOTE — TELEPHONE ENCOUNTER
----- Message from Vj Moody sent at 3/7/2025 11:01 AM EST -----  Can you please confirm which pharmacy he wants his Adderall sent to?

## 2025-04-04 DIAGNOSIS — F90.9 ATTENTION DEFICIT HYPERACTIVITY DISORDER (ADHD), UNSPECIFIED ADHD TYPE: ICD-10-CM

## 2025-04-04 RX ORDER — DEXTROAMPHETAMINE SACCHARATE, AMPHETAMINE ASPARTATE MONOHYDRATE, DEXTROAMPHETAMINE SULFATE AND AMPHETAMINE SULFATE 5; 5; 5; 5 MG/1; MG/1; MG/1; MG/1
20 CAPSULE, EXTENDED RELEASE ORAL EVERY MORNING
Qty: 30 CAPSULE | Refills: 0 | Status: SHIPPED | OUTPATIENT
Start: 2025-04-04 | End: 2025-05-04

## 2025-04-05 DIAGNOSIS — B00.1 RECURRENT COLD SORES: ICD-10-CM

## 2025-04-05 RX ORDER — VALACYCLOVIR HYDROCHLORIDE 1 G/1
TABLET, FILM COATED ORAL
Qty: 4 TABLET | Refills: 1 | Status: CANCELLED | OUTPATIENT
Start: 2025-04-05 | End: 2026-04-05

## 2025-04-05 RX ORDER — VALACYCLOVIR HYDROCHLORIDE 1 G/1
TABLET, FILM COATED ORAL
Qty: 4 TABLET | Refills: 1 | Status: SHIPPED | OUTPATIENT
Start: 2025-04-05 | End: 2026-04-05

## 2025-04-22 DIAGNOSIS — E10.9 TYPE 1 DIABETES MELLITUS WITHOUT COMPLICATION: ICD-10-CM

## 2025-04-22 RX ORDER — INSULIN PMP CART,AUT,G6/7,CNTR
1 EACH SUBCUTANEOUS
Qty: 30 EACH | Refills: 0 | Status: SHIPPED | OUTPATIENT
Start: 2025-04-22

## 2025-04-25 DIAGNOSIS — R41.840 DIFFICULTY CONCENTRATING: ICD-10-CM

## 2025-04-25 RX ORDER — DEXTROAMPHETAMINE SACCHARATE, AMPHETAMINE ASPARTATE, DEXTROAMPHETAMINE SULFATE AND AMPHETAMINE SULFATE 1.25; 1.25; 1.25; 1.25 MG/1; MG/1; MG/1; MG/1
5 TABLET ORAL DAILY PRN
Qty: 30 TABLET | Refills: 0 | Status: SHIPPED | OUTPATIENT
Start: 2025-04-25 | End: 2025-05-25

## 2025-05-09 ENCOUNTER — PATIENT MESSAGE (OUTPATIENT)
Dept: PRIMARY CARE | Facility: CLINIC | Age: 25
End: 2025-05-09
Payer: COMMERCIAL

## 2025-05-09 DIAGNOSIS — F90.9 ATTENTION DEFICIT HYPERACTIVITY DISORDER (ADHD), UNSPECIFIED ADHD TYPE: ICD-10-CM

## 2025-05-10 RX ORDER — DEXTROAMPHETAMINE SACCHARATE, AMPHETAMINE ASPARTATE MONOHYDRATE, DEXTROAMPHETAMINE SULFATE AND AMPHETAMINE SULFATE 5; 5; 5; 5 MG/1; MG/1; MG/1; MG/1
20 CAPSULE, EXTENDED RELEASE ORAL EVERY MORNING
Qty: 30 CAPSULE | Refills: 0 | Status: SHIPPED | OUTPATIENT
Start: 2025-05-10 | End: 2025-06-09

## 2025-05-19 DIAGNOSIS — E10.9 TYPE 1 DIABETES MELLITUS WITHOUT COMPLICATION: ICD-10-CM

## 2025-05-19 RX ORDER — INSULIN PMP CART,AUT,G6/7,CNTR
1 EACH SUBCUTANEOUS
Qty: 30 EACH | Refills: 0 | Status: SHIPPED | OUTPATIENT
Start: 2025-05-19

## 2025-06-02 DIAGNOSIS — E10.65 TYPE 1 DIABETES MELLITUS WITH HYPERGLYCEMIA (MULTI): ICD-10-CM

## 2025-06-04 DIAGNOSIS — E10.65 TYPE 1 DIABETES MELLITUS WITH HYPERGLYCEMIA (MULTI): ICD-10-CM

## 2025-06-05 DIAGNOSIS — E10.65 TYPE 1 DIABETES MELLITUS WITH HYPERGLYCEMIA (MULTI): ICD-10-CM

## 2025-06-20 ENCOUNTER — APPOINTMENT (OUTPATIENT)
Dept: ENDOCRINOLOGY | Facility: CLINIC | Age: 25
End: 2025-06-20
Payer: COMMERCIAL

## 2025-06-26 ENCOUNTER — APPOINTMENT (OUTPATIENT)
Dept: ENDOCRINOLOGY | Facility: CLINIC | Age: 25
End: 2025-06-26
Payer: COMMERCIAL

## 2025-07-14 ENCOUNTER — PATIENT MESSAGE (OUTPATIENT)
Dept: PRIMARY CARE | Facility: CLINIC | Age: 25
End: 2025-07-14
Payer: COMMERCIAL

## 2025-07-14 DIAGNOSIS — F90.9 ATTENTION DEFICIT HYPERACTIVITY DISORDER (ADHD), UNSPECIFIED ADHD TYPE: ICD-10-CM

## 2025-07-14 DIAGNOSIS — R41.840 DIFFICULTY CONCENTRATING: ICD-10-CM

## 2025-07-14 RX ORDER — DEXTROAMPHETAMINE SACCHARATE, AMPHETAMINE ASPARTATE MONOHYDRATE, DEXTROAMPHETAMINE SULFATE AND AMPHETAMINE SULFATE 5; 5; 5; 5 MG/1; MG/1; MG/1; MG/1
20 CAPSULE, EXTENDED RELEASE ORAL EVERY MORNING
Qty: 30 CAPSULE | Refills: 0 | Status: SHIPPED | OUTPATIENT
Start: 2025-07-14 | End: 2025-08-13

## 2025-07-18 RX ORDER — DEXTROAMPHETAMINE SACCHARATE, AMPHETAMINE ASPARTATE, DEXTROAMPHETAMINE SULFATE AND AMPHETAMINE SULFATE 1.25; 1.25; 1.25; 1.25 MG/1; MG/1; MG/1; MG/1
5 TABLET ORAL DAILY PRN
Qty: 30 TABLET | Refills: 0 | Status: SHIPPED | OUTPATIENT
Start: 2025-07-18 | End: 2025-08-17

## 2025-07-22 DIAGNOSIS — E10.9 TYPE 1 DIABETES MELLITUS WITHOUT COMPLICATION: ICD-10-CM

## 2025-07-22 RX ORDER — INSULIN PMP CART,AUT,G6/7,CNTR
1 EACH SUBCUTANEOUS
Qty: 30 EACH | Refills: 0 | Status: SHIPPED | OUTPATIENT
Start: 2025-07-22

## 2025-07-28 DIAGNOSIS — E10.9 TYPE 1 DIABETES MELLITUS WITHOUT COMPLICATION: ICD-10-CM

## 2025-07-28 RX ORDER — INSULIN PMP CART,AUT,G6/7,CNTR
1 EACH SUBCUTANEOUS
Qty: 15 EACH | Refills: 1 | Status: SHIPPED | OUTPATIENT
Start: 2025-07-28

## 2025-08-11 ENCOUNTER — HOSPITAL ENCOUNTER (OUTPATIENT)
Dept: RADIOLOGY | Facility: EXTERNAL LOCATION | Age: 25
Discharge: HOME | End: 2025-08-11

## 2025-08-11 ENCOUNTER — OFFICE VISIT (OUTPATIENT)
Dept: ORTHOPEDIC SURGERY | Facility: CLINIC | Age: 25
End: 2025-08-11
Payer: COMMERCIAL

## 2025-08-11 DIAGNOSIS — M25.531 RIGHT WRIST PAIN: ICD-10-CM

## 2025-08-11 DIAGNOSIS — M65.4 DE QUERVAIN'S TENOSYNOVITIS, RIGHT: Primary | ICD-10-CM

## 2025-08-11 PROCEDURE — 76942 ECHO GUIDE FOR BIOPSY: CPT | Performed by: FAMILY MEDICINE

## 2025-08-11 PROCEDURE — 20550 NJX 1 TENDON SHEATH/LIGAMENT: CPT | Performed by: FAMILY MEDICINE

## 2025-08-11 PROCEDURE — 99213 OFFICE O/P EST LOW 20 MIN: CPT | Performed by: FAMILY MEDICINE

## 2025-08-11 PROCEDURE — 1036F TOBACCO NON-USER: CPT | Performed by: FAMILY MEDICINE

## 2025-08-11 RX ORDER — TRIAMCINOLONE ACETONIDE 40 MG/ML
40 INJECTION, SUSPENSION INTRA-ARTICULAR; INTRAMUSCULAR
Status: COMPLETED | OUTPATIENT
Start: 2025-08-11 | End: 2025-08-11

## 2025-08-11 RX ORDER — LIDOCAINE HYDROCHLORIDE 20 MG/ML
1 INJECTION, SOLUTION INFILTRATION; PERINEURAL
Status: COMPLETED | OUTPATIENT
Start: 2025-08-11 | End: 2025-08-11

## 2025-08-11 RX ADMIN — LIDOCAINE HYDROCHLORIDE 1 ML: 20 INJECTION, SOLUTION INFILTRATION; PERINEURAL at 12:19

## 2025-08-11 RX ADMIN — TRIAMCINOLONE ACETONIDE 40 MG: 40 INJECTION, SUSPENSION INTRA-ARTICULAR; INTRAMUSCULAR at 12:19

## 2025-08-12 ENCOUNTER — OFFICE VISIT (OUTPATIENT)
Dept: PRIMARY CARE | Facility: CLINIC | Age: 25
End: 2025-08-12
Payer: COMMERCIAL

## 2025-08-12 ENCOUNTER — PHARMACY VISIT (OUTPATIENT)
Dept: PHARMACY | Facility: CLINIC | Age: 25
End: 2025-08-12
Payer: MEDICARE

## 2025-08-12 VITALS
OXYGEN SATURATION: 99 % | DIASTOLIC BLOOD PRESSURE: 70 MMHG | BODY MASS INDEX: 25.4 KG/M2 | HEART RATE: 93 BPM | RESPIRATION RATE: 16 BRPM | WEIGHT: 177 LBS | SYSTOLIC BLOOD PRESSURE: 128 MMHG

## 2025-08-12 DIAGNOSIS — Z79.899 MEDICATION MANAGEMENT: ICD-10-CM

## 2025-08-12 DIAGNOSIS — F90.9 ATTENTION DEFICIT HYPERACTIVITY DISORDER (ADHD), UNSPECIFIED ADHD TYPE: ICD-10-CM

## 2025-08-12 DIAGNOSIS — L98.9 SKIN LESION: ICD-10-CM

## 2025-08-12 DIAGNOSIS — Z00.00 HEALTHCARE MAINTENANCE: ICD-10-CM

## 2025-08-12 DIAGNOSIS — L01.00 IMPETIGO: Primary | ICD-10-CM

## 2025-08-12 DIAGNOSIS — E10.9 TYPE 1 DIABETES MELLITUS WITHOUT COMPLICATION: ICD-10-CM

## 2025-08-12 PROCEDURE — 3078F DIAST BP <80 MM HG: CPT | Performed by: INTERNAL MEDICINE

## 2025-08-12 PROCEDURE — RXMED WILLOW AMBULATORY MEDICATION CHARGE

## 2025-08-12 PROCEDURE — 3074F SYST BP LT 130 MM HG: CPT | Performed by: INTERNAL MEDICINE

## 2025-08-12 PROCEDURE — 99214 OFFICE O/P EST MOD 30 MIN: CPT | Performed by: INTERNAL MEDICINE

## 2025-08-12 PROCEDURE — 1036F TOBACCO NON-USER: CPT | Performed by: INTERNAL MEDICINE

## 2025-08-12 RX ORDER — MUPIROCIN 20 MG/G
OINTMENT TOPICAL 3 TIMES DAILY
Qty: 30 G | Refills: 1 | Status: SHIPPED | OUTPATIENT
Start: 2025-08-12 | End: 2025-08-27

## 2025-08-12 ASSESSMENT — ENCOUNTER SYMPTOMS
ANOREXIA: 0
RHINORRHEA: 0
COUGH: 0
SHORTNESS OF BREATH: 0
VOMITING: 0
NAIL CHANGES: 0
SORE THROAT: 0
DIARRHEA: 0
FEVER: 0
FATIGUE: 0
EYE PAIN: 0

## 2025-08-14 LAB
HSV1 DNA SPEC QL NAA+PROBE: NOT DETECTED
HSV2 DNA SPEC QL NAA+PROBE: NOT DETECTED
SPECIMEN SOURCE: NORMAL

## 2025-08-19 LAB
ALBUMIN SERPL-MCNC: 4.6 G/DL (ref 3.6–5.1)
ALBUMIN/CREAT UR: 2 MG/G CREAT
ALP SERPL-CCNC: 55 U/L (ref 36–130)
ALT SERPL-CCNC: 33 U/L (ref 9–46)
ANION GAP SERPL CALCULATED.4IONS-SCNC: 10 MMOL/L (CALC) (ref 7–17)
AST SERPL-CCNC: 26 U/L (ref 10–40)
BASOPHILS # BLD AUTO: 74 CELLS/UL (ref 0–200)
BASOPHILS NFR BLD AUTO: 0.8 %
BILIRUB SERPL-MCNC: 1.1 MG/DL (ref 0.2–1.2)
BUN SERPL-MCNC: 11 MG/DL (ref 7–25)
CALCIUM SERPL-MCNC: 9.7 MG/DL (ref 8.6–10.3)
CHLORIDE SERPL-SCNC: 99 MMOL/L (ref 98–110)
CHOLEST SERPL-MCNC: 178 MG/DL
CHOLEST/HDLC SERPL: 3.2 (CALC)
CO2 SERPL-SCNC: 28 MMOL/L (ref 20–32)
CREAT SERPL-MCNC: 0.99 MG/DL (ref 0.6–1.24)
CREAT UR-MCNC: 423 MG/DL (ref 20–320)
EGFRCR SERPLBLD CKD-EPI 2021: 108 ML/MIN/1.73M2
EOSINOPHIL # BLD AUTO: 37 CELLS/UL (ref 15–500)
EOSINOPHIL NFR BLD AUTO: 0.4 %
ERYTHROCYTE [DISTWIDTH] IN BLOOD BY AUTOMATED COUNT: 12.4 % (ref 11–15)
EST. AVERAGE GLUCOSE BLD GHB EST-MCNC: 140 MG/DL
EST. AVERAGE GLUCOSE BLD GHB EST-SCNC: 7.7 MMOL/L
GLUCOSE SERPL-MCNC: 129 MG/DL (ref 65–99)
HBA1C MFR BLD: 6.5 %
HCT VFR BLD AUTO: 49.6 % (ref 38.5–50)
HDLC SERPL-MCNC: 55 MG/DL
HGB BLD-MCNC: 16.9 G/DL (ref 13.2–17.1)
LDLC SERPL CALC-MCNC: 104 MG/DL (CALC)
LYMPHOCYTES # BLD AUTO: 1325 CELLS/UL (ref 850–3900)
LYMPHOCYTES NFR BLD AUTO: 14.4 %
MCH RBC QN AUTO: 33.4 PG (ref 27–33)
MCHC RBC AUTO-ENTMCNC: 34.1 G/DL (ref 32–36)
MCV RBC AUTO: 98 FL (ref 80–100)
MICROALBUMIN UR-MCNC: 1 MG/DL
MONOCYTES # BLD AUTO: 874 CELLS/UL (ref 200–950)
MONOCYTES NFR BLD AUTO: 9.5 %
NEUTROPHILS # BLD AUTO: 6891 CELLS/UL (ref 1500–7800)
NEUTROPHILS NFR BLD AUTO: 74.9 %
NONHDLC SERPL-MCNC: 123 MG/DL (CALC)
PLATELET # BLD AUTO: 276 THOUSAND/UL (ref 140–400)
PMV BLD REES-ECKER: 10.3 FL (ref 7.5–12.5)
POTASSIUM SERPL-SCNC: 4.1 MMOL/L (ref 3.5–5.3)
PROT SERPL-MCNC: 6.7 G/DL (ref 6.1–8.1)
RBC # BLD AUTO: 5.06 MILLION/UL (ref 4.2–5.8)
SODIUM SERPL-SCNC: 137 MMOL/L (ref 135–146)
TRIGL SERPL-MCNC: 101 MG/DL
WBC # BLD AUTO: 9.2 THOUSAND/UL (ref 3.8–10.8)

## 2025-08-20 DIAGNOSIS — F90.9 ATTENTION DEFICIT HYPERACTIVITY DISORDER (ADHD), UNSPECIFIED ADHD TYPE: ICD-10-CM

## 2025-08-20 DIAGNOSIS — E10.9 TYPE 1 DIABETES MELLITUS WITHOUT COMPLICATION: ICD-10-CM

## 2025-08-20 DIAGNOSIS — R41.840 DIFFICULTY CONCENTRATING: ICD-10-CM

## 2025-08-20 RX ORDER — INSULIN ASPART 100 [IU]/ML
INJECTION, SOLUTION INTRAVENOUS; SUBCUTANEOUS
Qty: 10 ML | Refills: 3 | Status: SHIPPED | OUTPATIENT
Start: 2025-08-20 | End: 2025-08-20 | Stop reason: SDUPTHER

## 2025-08-20 RX ORDER — INSULIN ASPART 100 [IU]/ML
INJECTION, SOLUTION INTRAVENOUS; SUBCUTANEOUS
Qty: 10 ML | Refills: 3 | Status: SHIPPED | OUTPATIENT
Start: 2025-08-20

## 2025-08-20 RX ORDER — DEXTROAMPHETAMINE SACCHARATE, AMPHETAMINE ASPARTATE, DEXTROAMPHETAMINE SULFATE AND AMPHETAMINE SULFATE 1.25; 1.25; 1.25; 1.25 MG/1; MG/1; MG/1; MG/1
5 TABLET ORAL DAILY PRN
Qty: 30 TABLET | Refills: 0 | Status: SHIPPED | OUTPATIENT
Start: 2025-08-20 | End: 2025-08-20 | Stop reason: SDUPTHER

## 2025-08-20 RX ORDER — INSULIN PMP CART,AUT,G6/7,CNTR
1 EACH SUBCUTANEOUS
Qty: 15 EACH | Refills: 1 | Status: SHIPPED | OUTPATIENT
Start: 2025-08-20 | End: 2025-08-20 | Stop reason: SDUPTHER

## 2025-08-20 RX ORDER — DEXTROAMPHETAMINE SACCHARATE, AMPHETAMINE ASPARTATE MONOHYDRATE, DEXTROAMPHETAMINE SULFATE AND AMPHETAMINE SULFATE 5; 5; 5; 5 MG/1; MG/1; MG/1; MG/1
20 CAPSULE, EXTENDED RELEASE ORAL EVERY MORNING
Qty: 30 CAPSULE | Refills: 0 | Status: SHIPPED | OUTPATIENT
Start: 2025-08-20 | End: 2025-09-19

## 2025-08-20 RX ORDER — DEXTROAMPHETAMINE SACCHARATE, AMPHETAMINE ASPARTATE MONOHYDRATE, DEXTROAMPHETAMINE SULFATE AND AMPHETAMINE SULFATE 5; 5; 5; 5 MG/1; MG/1; MG/1; MG/1
20 CAPSULE, EXTENDED RELEASE ORAL EVERY MORNING
Qty: 30 CAPSULE | Refills: 0 | Status: SHIPPED | OUTPATIENT
Start: 2025-08-20 | End: 2025-08-20 | Stop reason: SDUPTHER

## 2025-08-20 RX ORDER — DEXTROAMPHETAMINE SACCHARATE, AMPHETAMINE ASPARTATE, DEXTROAMPHETAMINE SULFATE AND AMPHETAMINE SULFATE 1.25; 1.25; 1.25; 1.25 MG/1; MG/1; MG/1; MG/1
5 TABLET ORAL DAILY PRN
Qty: 30 TABLET | Refills: 0 | Status: SHIPPED | OUTPATIENT
Start: 2025-08-20 | End: 2025-09-19

## 2025-08-20 RX ORDER — INSULIN PMP CART,AUT,G6/7,CNTR
1 EACH SUBCUTANEOUS
Qty: 15 EACH | Refills: 1 | Status: SHIPPED | OUTPATIENT
Start: 2025-08-20

## 2025-08-21 ENCOUNTER — PATIENT MESSAGE (OUTPATIENT)
Dept: PRIMARY CARE | Facility: CLINIC | Age: 25
End: 2025-08-21
Payer: COMMERCIAL

## 2025-08-26 ENCOUNTER — PATIENT MESSAGE (OUTPATIENT)
Dept: ENDOCRINOLOGY | Facility: CLINIC | Age: 25
End: 2025-08-26
Payer: COMMERCIAL

## 2025-08-26 DIAGNOSIS — E10.9 TYPE 1 DIABETES MELLITUS WITHOUT COMPLICATION: ICD-10-CM

## 2025-08-27 RX ORDER — INSULIN PMP CART,AUT,G6/7,CNTR
1 EACH SUBCUTANEOUS EVERY OTHER DAY
Qty: 45 EACH | Refills: 1 | Status: SHIPPED | OUTPATIENT
Start: 2025-08-27 | End: 2025-08-29 | Stop reason: SDUPTHER

## 2025-08-29 DIAGNOSIS — E10.9 TYPE 1 DIABETES MELLITUS WITHOUT COMPLICATION: ICD-10-CM

## 2025-08-29 RX ORDER — INSULIN PMP CART,AUT,G6/7,CNTR
1 EACH SUBCUTANEOUS EVERY OTHER DAY
Qty: 45 EACH | Refills: 1 | Status: SHIPPED | OUTPATIENT
Start: 2025-08-29

## 2026-01-13 ENCOUNTER — APPOINTMENT (OUTPATIENT)
Dept: ENDOCRINOLOGY | Facility: CLINIC | Age: 26
End: 2026-01-13
Payer: COMMERCIAL

## 2026-01-15 ENCOUNTER — APPOINTMENT (OUTPATIENT)
Dept: ENDOCRINOLOGY | Facility: CLINIC | Age: 26
End: 2026-01-15
Payer: COMMERCIAL